# Patient Record
Sex: FEMALE | Race: WHITE | NOT HISPANIC OR LATINO | Employment: OTHER | URBAN - NONMETROPOLITAN AREA
[De-identification: names, ages, dates, MRNs, and addresses within clinical notes are randomized per-mention and may not be internally consistent; named-entity substitution may affect disease eponyms.]

---

## 2024-09-23 ENCOUNTER — OFFICE VISIT (OUTPATIENT)
Dept: FAMILY MEDICINE | Facility: CLINIC | Age: 49
End: 2024-09-23
Payer: MEDICARE

## 2024-09-23 VITALS
HEIGHT: 65 IN | BODY MASS INDEX: 30.66 KG/M2 | SYSTOLIC BLOOD PRESSURE: 110 MMHG | DIASTOLIC BLOOD PRESSURE: 64 MMHG | WEIGHT: 184 LBS | OXYGEN SATURATION: 97 % | HEART RATE: 83 BPM | TEMPERATURE: 97 F | RESPIRATION RATE: 18 BRPM

## 2024-09-23 DIAGNOSIS — F33.1 MODERATE EPISODE OF RECURRENT MAJOR DEPRESSIVE DISORDER: ICD-10-CM

## 2024-09-23 DIAGNOSIS — K21.9 GASTROESOPHAGEAL REFLUX DISEASE, UNSPECIFIED WHETHER ESOPHAGITIS PRESENT: ICD-10-CM

## 2024-09-23 DIAGNOSIS — R07.9 CHEST PAIN, UNSPECIFIED TYPE: ICD-10-CM

## 2024-09-23 DIAGNOSIS — E66.09 CLASS 1 OBESITY DUE TO EXCESS CALORIES WITH SERIOUS COMORBIDITY AND BODY MASS INDEX (BMI) OF 31.0 TO 31.9 IN ADULT: ICD-10-CM

## 2024-09-23 DIAGNOSIS — F41.9 ANXIETY: Primary | ICD-10-CM

## 2024-09-23 DIAGNOSIS — E78.2 MIXED HYPERLIPIDEMIA: ICD-10-CM

## 2024-09-23 DIAGNOSIS — Z87.19 HISTORY OF LIVER DISEASE: ICD-10-CM

## 2024-09-23 DIAGNOSIS — I10 ESSENTIAL HYPERTENSION: ICD-10-CM

## 2024-09-23 LAB
ALBUMIN SERPL BCP-MCNC: 3.9 G/DL (ref 3.5–5)
ALBUMIN/GLOB SERPL: 1.1 {RATIO}
ALP SERPL-CCNC: 104 U/L (ref 41–108)
ALT SERPL W P-5'-P-CCNC: 75 U/L (ref 13–56)
ANION GAP SERPL CALCULATED.3IONS-SCNC: 10 MMOL/L (ref 7–16)
AST SERPL W P-5'-P-CCNC: 44 U/L (ref 15–37)
BASOPHILS # BLD AUTO: 0.08 K/UL (ref 0–0.2)
BASOPHILS NFR BLD AUTO: 0.9 % (ref 0–1)
BILIRUB SERPL-MCNC: 0.4 MG/DL (ref ?–1.2)
BUN SERPL-MCNC: 21 MG/DL (ref 7–18)
BUN/CREAT SERPL: 25 (ref 6–20)
CALCIUM SERPL-MCNC: 9.4 MG/DL (ref 8.5–10.1)
CHLORIDE SERPL-SCNC: 103 MMOL/L (ref 98–107)
CHOLEST SERPL-MCNC: 184 MG/DL (ref 0–200)
CHOLEST/HDLC SERPL: 2.6 {RATIO}
CO2 SERPL-SCNC: 29 MMOL/L (ref 21–32)
CREAT SERPL-MCNC: 0.83 MG/DL (ref 0.55–1.02)
DIFFERENTIAL METHOD BLD: ABNORMAL
EGFR (NO RACE VARIABLE) (RUSH/TITUS): 86 ML/MIN/1.73M2
EOSINOPHIL # BLD AUTO: 0.12 K/UL (ref 0–0.5)
EOSINOPHIL NFR BLD AUTO: 1.4 % (ref 1–4)
ERYTHROCYTE [DISTWIDTH] IN BLOOD BY AUTOMATED COUNT: 12.6 % (ref 11.5–14.5)
GLOBULIN SER-MCNC: 3.7 G/DL (ref 2–4)
GLUCOSE SERPL-MCNC: 76 MG/DL (ref 74–106)
HCT VFR BLD AUTO: 44.1 % (ref 38–47)
HDLC SERPL-MCNC: 71 MG/DL (ref 40–60)
HGB BLD-MCNC: 14.4 G/DL (ref 12–16)
IMM GRANULOCYTES # BLD AUTO: 0.03 K/UL (ref 0–0.04)
IMM GRANULOCYTES NFR BLD: 0.3 % (ref 0–0.4)
LDLC SERPL CALC-MCNC: 73 MG/DL
LDLC/HDLC SERPL: 1 {RATIO}
LYMPHOCYTES # BLD AUTO: 2.34 K/UL (ref 1–4.8)
LYMPHOCYTES NFR BLD AUTO: 26.4 % (ref 27–41)
MCH RBC QN AUTO: 30.9 PG (ref 27–31)
MCHC RBC AUTO-ENTMCNC: 32.7 G/DL (ref 32–36)
MCV RBC AUTO: 94.6 FL (ref 80–96)
MONOCYTES # BLD AUTO: 0.78 K/UL (ref 0–0.8)
MONOCYTES NFR BLD AUTO: 8.8 % (ref 2–6)
MPC BLD CALC-MCNC: 12.1 FL (ref 9.4–12.4)
NEUTROPHILS # BLD AUTO: 5.52 K/UL (ref 1.8–7.7)
NEUTROPHILS NFR BLD AUTO: 62.2 % (ref 53–65)
NONHDLC SERPL-MCNC: 113 MG/DL
NRBC # BLD AUTO: 0 X10E3/UL
NRBC, AUTO (.00): 0 %
PLATELET # BLD AUTO: 288 K/UL (ref 150–400)
POTASSIUM SERPL-SCNC: 3.9 MMOL/L (ref 3.5–5.1)
PROT SERPL-MCNC: 7.6 G/DL (ref 6.4–8.2)
RBC # BLD AUTO: 4.66 M/UL (ref 4.2–5.4)
SODIUM SERPL-SCNC: 138 MMOL/L (ref 136–145)
TRIGL SERPL-MCNC: 199 MG/DL (ref 35–150)
VLDLC SERPL-MCNC: 40 MG/DL
WBC # BLD AUTO: 8.87 K/UL (ref 4.5–11)

## 2024-09-23 PROCEDURE — 1160F RVW MEDS BY RX/DR IN RCRD: CPT | Mod: ,,, | Performed by: FAMILY MEDICINE

## 2024-09-23 PROCEDURE — 4010F ACE/ARB THERAPY RXD/TAKEN: CPT | Mod: ,,, | Performed by: FAMILY MEDICINE

## 2024-09-23 PROCEDURE — 3078F DIAST BP <80 MM HG: CPT | Mod: ,,, | Performed by: FAMILY MEDICINE

## 2024-09-23 PROCEDURE — 85025 COMPLETE CBC W/AUTO DIFF WBC: CPT | Mod: ,,, | Performed by: CLINICAL MEDICAL LABORATORY

## 2024-09-23 PROCEDURE — 3074F SYST BP LT 130 MM HG: CPT | Mod: ,,, | Performed by: FAMILY MEDICINE

## 2024-09-23 PROCEDURE — 1159F MED LIST DOCD IN RCRD: CPT | Mod: ,,, | Performed by: FAMILY MEDICINE

## 2024-09-23 PROCEDURE — 3008F BODY MASS INDEX DOCD: CPT | Mod: ,,, | Performed by: FAMILY MEDICINE

## 2024-09-23 PROCEDURE — 99204 OFFICE O/P NEW MOD 45 MIN: CPT | Mod: ,,, | Performed by: FAMILY MEDICINE

## 2024-09-23 PROCEDURE — 80053 COMPREHEN METABOLIC PANEL: CPT | Mod: ,,, | Performed by: CLINICAL MEDICAL LABORATORY

## 2024-09-23 PROCEDURE — 93005 ELECTROCARDIOGRAM TRACING: CPT | Mod: ,,, | Performed by: FAMILY MEDICINE

## 2024-09-23 PROCEDURE — 93010 ELECTROCARDIOGRAM REPORT: CPT | Mod: ,,, | Performed by: FAMILY MEDICINE

## 2024-09-23 PROCEDURE — 80061 LIPID PANEL: CPT | Mod: ,,, | Performed by: CLINICAL MEDICAL LABORATORY

## 2024-09-23 RX ORDER — TRAZODONE HYDROCHLORIDE 100 MG/1
100 TABLET ORAL NIGHTLY
COMMUNITY

## 2024-09-23 RX ORDER — ATORVASTATIN CALCIUM 40 MG/1
20 TABLET, FILM COATED ORAL NIGHTLY
COMMUNITY
Start: 2024-02-21

## 2024-09-23 RX ORDER — OMEPRAZOLE 40 MG/1
40 CAPSULE, DELAYED RELEASE ORAL EVERY MORNING
COMMUNITY

## 2024-09-23 RX ORDER — VALSARTAN 40 MG/1
40 TABLET ORAL DAILY
COMMUNITY
Start: 2024-07-15 | End: 2025-07-15

## 2024-09-23 RX ORDER — HYDROXYZINE HYDROCHLORIDE 50 MG/1
50 TABLET, FILM COATED ORAL 3 TIMES DAILY PRN
Qty: 30 TABLET | Refills: 2 | Status: SHIPPED | OUTPATIENT
Start: 2024-09-23

## 2024-09-23 RX ORDER — HYDROXYZINE HYDROCHLORIDE 50 MG/1
50 TABLET, FILM COATED ORAL 3 TIMES DAILY PRN
COMMUNITY
Start: 2024-07-15 | End: 2024-09-23 | Stop reason: SDUPTHER

## 2024-09-23 RX ORDER — ESTRADIOL 1 MG/1
1 TABLET ORAL DAILY
COMMUNITY
Start: 2024-07-22

## 2024-09-23 RX ORDER — SERTRALINE HYDROCHLORIDE 25 MG/1
25 TABLET, FILM COATED ORAL DAILY
Qty: 30 TABLET | Refills: 5 | Status: SHIPPED | OUTPATIENT
Start: 2024-09-23 | End: 2025-09-23

## 2024-09-23 NOTE — PROGRESS NOTES
New Clinic Note    Chantel Mejia is a 48 y.o. female     CC:   Chief Complaint   Patient presents with    Establish Care     New patient to this clinic that wants to establish care with a new PCP. Moved here from [place] while in the middle of a divorce to take care of her elderly father who has multiple health issues. Wants to use [facility].     Hypertension    Hyperlipidemia    Anxiety    Medication Refill    Depression     Score 21 on Depression Scale. Lives with her son and takes care of elderly parents and going thru a divorce. Moved here from [place].         Subjective    History of Present Illness HPI   Patient is here to establish with a new physician. She has recently moved to [facility]. She is getting a divorce and is taking care of her elderly father. She reports having anxiety and depression. She takes hydroxyzine daily for anxiety. She complains of intermittent substernal chest pain for several months. She denies shortness of breath or nausea. She is not sure if it is related to her anxiety.     Current Outpatient Medications:     atorvastatin (LIPITOR) 40 MG tablet, Take 20 mg by mouth every evening., Disp: , Rfl:     estradioL (ESTRACE) 1 MG tablet, Take 1 mg by mouth once daily., Disp: , Rfl:     omeprazole (PRILOSEC) 40 MG capsule, Take 40 mg by mouth every morning., Disp: , Rfl:     traZODone (DESYREL) 100 MG tablet, Take 100 mg by mouth every evening., Disp: , Rfl:     valsartan (DIOVAN) 40 MG tablet, Take 40 mg by mouth once daily., Disp: , Rfl:     hydrOXYzine (ATARAX) 50 MG tablet, Take 1 tablet (50 mg total) by mouth 3 (three) times daily as needed for Anxiety., Disp: 30 tablet, Rfl: 2    sertraline (ZOLOFT) 25 MG tablet, Take 1 tablet (25 mg total) by mouth once daily., Disp: 30 tablet, Rfl: 5     Past Medical History:   Diagnosis Date    Anxiety     GERD (gastroesophageal reflux disease)     Hyperlipidemia     Hypertension         Family History   Problem Relation Name Age of Onset     "Arthritis Mother      Hyperlipidemia Mother      COPD Father      Hyperlipidemia Father      Heart disease Father      Diabetes Father      Dementia Father      Mental illness Brother          Past Surgical History:   Procedure Laterality Date    BREAST SURGERY       SECTION      CHOLECYSTECTOMY      EYE SURGERY      HYSTERECTOMY      JOINT REPLACEMENT          Review of Systems   Constitutional:  Negative for fatigue and fever.   HENT:  Negative for ear pain, postnasal drip, rhinorrhea and sinus pressure/congestion.    Respiratory:  Negative for cough and shortness of breath.    Cardiovascular:  Positive for chest pain.   Gastrointestinal:  Negative for abdominal pain, diarrhea, nausea and vomiting.   Genitourinary:  Negative for dysuria.   Neurological:  Negative for headaches.        /64 (BP Location: Left arm, Patient Position: Sitting, BP Method: Large (Automatic))   Pulse 83   Temp 97.2 °F (36.2 °C) (Oral)   Resp 18   Ht 5' 4.5" (1.638 m)   Wt 83.5 kg (184 lb)   SpO2 97%   BMI 31.10 kg/m²      Physical Exam  HENT:      Head: Normocephalic and atraumatic.   Cardiovascular:      Rate and Rhythm: Normal rate and regular rhythm.   Pulmonary:      Effort: Pulmonary effort is normal.      Breath sounds: Normal breath sounds.   Neurological:      Mental Status: She is alert and oriented to person, place, and time.      Gait: Gait abnormal.      Comments: Walks with a cane    Psychiatric:         Mood and Affect: Mood normal.         Behavior: Behavior normal.          Assessment and Plan      ICD-10-CM ICD-9-CM   1. Anxiety  F41.9 300.00   2. Moderate episode of recurrent major depressive disorder  F33.1 296.32   3. Mixed hyperlipidemia  E78.2 272.2   4. Gastroesophageal reflux disease, unspecified whether esophagitis present  K21.9 530.81   5. History of liver disease  Z87.19 V12.79   6. Essential hypertension  I10 401.9   7. Class 1 obesity due to excess calories with serious comorbidity and " body mass index (BMI) of 31.0 to 31.9 in adult  E66.09 278.00    Z68.31 V85.31   8. Chest pain, unspecified type  R07.9 786.50        1. Anxiety  Not controlled. Start Zoloft 25mg. Follow up in 3 weeks.   -     hydrOXYzine (ATARAX) 50 MG tablet; Take 1 tablet (50 mg total) by mouth 3 (three) times daily as needed for Anxiety.  Dispense: 30 tablet; Refill: 2  -     sertraline (ZOLOFT) 25 MG tablet; Take 1 tablet (25 mg total) by mouth once daily.  Dispense: 30 tablet; Refill: 5    2. Moderate episode of recurrent major depressive disorder  Not controlled. Start Zoloft 25mg.   -     sertraline (ZOLOFT) 25 MG tablet; Take 1 tablet (25 mg total) by mouth once daily.  Dispense: 30 tablet; Refill: 5    3. Mixed hyperlipidemia  The current medical regimen is effective;  continue present plan and medications.  -     Lipid Panel; Future; Expected date: 09/23/2024    4. Gastroesophageal reflux disease, unspecified whether esophagitis present  The current medical regimen is effective;  continue present plan and medications.    5. History of liver disease  Patient reports that it has been controlled since she lost 60 pounds.     6. Essential hypertension  The current medical regimen is effective;  continue present plan and medications.  -     Comprehensive Metabolic Panel; Future; Expected date: 09/23/2024  -     CBC Auto Differential; Future; Expected date: 09/23/2024  -     Lipid Panel; Future; Expected date: 09/23/2024    7. Class 1 obesity due to excess calories with serious comorbidity and body mass index (BMI) of 31.0 to 31.9 in adult  Diet and educational handouts given.    8. Chest pain  EKG - normal sinus rhythm. No ST elevation or depression.     Follow up in about 3 weeks (around 10/14/2024).

## 2024-09-24 NOTE — PROGRESS NOTES
Liver enzymes are a little elevated. She has a history of liver disease. Will need to set her up with GI so they can monitor.

## 2024-09-25 ENCOUNTER — TELEPHONE (OUTPATIENT)
Dept: FAMILY MEDICINE | Facility: CLINIC | Age: 49
End: 2024-09-25
Payer: MEDICARE

## 2024-09-25 DIAGNOSIS — R74.8 ELEVATED LIVER ENZYMES: Primary | ICD-10-CM

## 2024-09-25 NOTE — TELEPHONE ENCOUNTER
----- Message from Cece Hamilton MD sent at 9/24/2024  8:37 AM CDT -----  Liver enzymes are a little elevated. She has a history of liver disease. Will need to set her up with GI so they can monitor.

## 2024-10-14 ENCOUNTER — OFFICE VISIT (OUTPATIENT)
Dept: FAMILY MEDICINE | Facility: CLINIC | Age: 49
End: 2024-10-14
Payer: MEDICARE

## 2024-10-14 VITALS
TEMPERATURE: 97 F | HEART RATE: 56 BPM | DIASTOLIC BLOOD PRESSURE: 54 MMHG | HEIGHT: 65 IN | SYSTOLIC BLOOD PRESSURE: 97 MMHG | WEIGHT: 184 LBS | BODY MASS INDEX: 30.66 KG/M2 | OXYGEN SATURATION: 98 % | RESPIRATION RATE: 18 BRPM

## 2024-10-14 DIAGNOSIS — F33.1 MODERATE EPISODE OF RECURRENT MAJOR DEPRESSIVE DISORDER: ICD-10-CM

## 2024-10-14 DIAGNOSIS — F41.9 ANXIETY: Primary | ICD-10-CM

## 2024-10-14 DIAGNOSIS — Z12.31 ENCOUNTER FOR SCREENING MAMMOGRAM FOR MALIGNANT NEOPLASM OF BREAST: ICD-10-CM

## 2024-10-14 PROCEDURE — 1160F RVW MEDS BY RX/DR IN RCRD: CPT | Mod: ,,, | Performed by: FAMILY MEDICINE

## 2024-10-14 PROCEDURE — 1159F MED LIST DOCD IN RCRD: CPT | Mod: ,,, | Performed by: FAMILY MEDICINE

## 2024-10-14 PROCEDURE — 99214 OFFICE O/P EST MOD 30 MIN: CPT | Mod: ,,, | Performed by: FAMILY MEDICINE

## 2024-10-14 PROCEDURE — 3074F SYST BP LT 130 MM HG: CPT | Mod: ,,, | Performed by: FAMILY MEDICINE

## 2024-10-14 PROCEDURE — 3008F BODY MASS INDEX DOCD: CPT | Mod: ,,, | Performed by: FAMILY MEDICINE

## 2024-10-14 PROCEDURE — 3078F DIAST BP <80 MM HG: CPT | Mod: ,,, | Performed by: FAMILY MEDICINE

## 2024-10-14 PROCEDURE — 4010F ACE/ARB THERAPY RXD/TAKEN: CPT | Mod: ,,, | Performed by: FAMILY MEDICINE

## 2024-10-14 RX ORDER — SERTRALINE HYDROCHLORIDE 50 MG/1
50 TABLET, FILM COATED ORAL DAILY
Qty: 30 TABLET | Refills: 11 | Status: SHIPPED | OUTPATIENT
Start: 2024-10-14 | End: 2025-10-14

## 2024-10-14 NOTE — PROGRESS NOTES
New Clinic Note    Chantel Mejia is a 48 y.o. female     CC:   Chief Complaint   Patient presents with    Anxiety     Patient is here for three week follow up after starting on Sertraline. Stated she really cannot tell a difference and wants to increase her level. She has an appointment with GI on 10/28/24 for elevated liver enzymes. Never had Bone Density Test and stated she hurts all over in her bones. Stated she has strong FH of osteoarthritis. Wants a Mammogram at [facility]. She is up to date on PAP. Does not do the Flu vaccine.    Follow-up        Subjective    History of Present Illness HPI   Patient is here to follow up on anxiety and depression. She is tolerating the sertraline but does not think it is strong enough. It is not controlling her symptoms.     Current Outpatient Medications:     atorvastatin (LIPITOR) 40 MG tablet, Take 20 mg by mouth every evening., Disp: , Rfl:     estradioL (ESTRACE) 1 MG tablet, Take 1 mg by mouth once daily., Disp: , Rfl:     hydrOXYzine (ATARAX) 50 MG tablet, Take 1 tablet (50 mg total) by mouth 3 (three) times daily as needed for Anxiety., Disp: 30 tablet, Rfl: 2    omeprazole (PRILOSEC) 40 MG capsule, Take 40 mg by mouth every morning., Disp: , Rfl:     traZODone (DESYREL) 100 MG tablet, Take 100 mg by mouth every evening., Disp: , Rfl:     valsartan (DIOVAN) 40 MG tablet, Take 40 mg by mouth once daily., Disp: , Rfl:     sertraline (ZOLOFT) 50 MG tablet, Take 1 tablet (50 mg total) by mouth once daily., Disp: 30 tablet, Rfl: 11     Past Medical History:   Diagnosis Date    Anxiety     GERD (gastroesophageal reflux disease)     Hyperlipidemia     Hypertension         Family History   Problem Relation Name Age of Onset    Arthritis Mother      Hyperlipidemia Mother      COPD Father      Hyperlipidemia Father      Heart disease Father      Diabetes Father      Dementia Father      Mental illness Brother          Past Surgical History:   Procedure Laterality Date    BREAST  "SURGERY       SECTION      CHOLECYSTECTOMY      EYE SURGERY      HYSTERECTOMY      JOINT REPLACEMENT          Review of Systems   Constitutional:  Negative for fatigue and fever.   HENT:  Negative for ear pain, postnasal drip, rhinorrhea and sinus pressure/congestion.    Respiratory:  Negative for cough and shortness of breath.    Cardiovascular:  Negative for chest pain.   Gastrointestinal:  Negative for abdominal pain, diarrhea, nausea and vomiting.   Genitourinary:  Negative for dysuria.   Neurological:  Negative for headaches.        BP (!) 97/54   Pulse (!) 56   Temp 97.4 °F (36.3 °C) (Oral)   Resp 18   Ht 5' 4.5" (1.638 m)   Wt 83.5 kg (184 lb)   SpO2 98%   BMI 31.10 kg/m²      Physical Exam  HENT:      Head: Normocephalic and atraumatic.   Cardiovascular:      Rate and Rhythm: Normal rate and regular rhythm.   Pulmonary:      Effort: Pulmonary effort is normal.      Breath sounds: Normal breath sounds.   Neurological:      Mental Status: She is alert and oriented to person, place, and time.   Psychiatric:         Mood and Affect: Mood normal.         Behavior: Behavior normal.          Assessment and Plan      ICD-10-CM ICD-9-CM   1. Anxiety  F41.9 300.00   2. Moderate episode of recurrent major depressive disorder  F33.1 296.32   3. Encounter for screening mammogram for malignant neoplasm of breast  Z12.31 V76.12        1. Anxiety  Not controlled. Increase sertraline to 50 mg. Recheck in 3 weeks.   -     sertraline (ZOLOFT) 50 MG tablet; Take 1 tablet (50 mg total) by mouth once daily.  Dispense: 30 tablet; Refill: 11    2. Moderate episode of recurrent major depressive disorder  Not controlled. Increase sertraline to 50 mg. Recheck in 3 weeks.  -     sertraline (ZOLOFT) 50 MG tablet; Take 1 tablet (50 mg total) by mouth once daily.  Dispense: 30 tablet; Refill: 11    3. Encounter for screening mammogram for malignant neoplasm of breast  -     Mammo Digital Screening Bilat w/ [facility]; Future; " Expected date: 10/14/2024          Follow up in about 3 weeks (around 11/4/2024).

## 2024-11-04 ENCOUNTER — OFFICE VISIT (OUTPATIENT)
Dept: FAMILY MEDICINE | Facility: CLINIC | Age: 49
End: 2024-11-04
Payer: MEDICARE

## 2024-11-04 VITALS
SYSTOLIC BLOOD PRESSURE: 107 MMHG | HEART RATE: 67 BPM | OXYGEN SATURATION: 98 % | DIASTOLIC BLOOD PRESSURE: 64 MMHG | TEMPERATURE: 98 F | BODY MASS INDEX: 30.82 KG/M2 | HEIGHT: 65 IN | RESPIRATION RATE: 18 BRPM | WEIGHT: 185 LBS

## 2024-11-04 DIAGNOSIS — F41.9 ANXIETY: ICD-10-CM

## 2024-11-04 DIAGNOSIS — E78.2 MIXED HYPERLIPIDEMIA: ICD-10-CM

## 2024-11-04 DIAGNOSIS — I10 ESSENTIAL HYPERTENSION: ICD-10-CM

## 2024-11-04 DIAGNOSIS — K21.9 GASTROESOPHAGEAL REFLUX DISEASE, UNSPECIFIED WHETHER ESOPHAGITIS PRESENT: ICD-10-CM

## 2024-11-04 DIAGNOSIS — F33.1 MODERATE EPISODE OF RECURRENT MAJOR DEPRESSIVE DISORDER: Primary | ICD-10-CM

## 2024-11-04 PROCEDURE — 99214 OFFICE O/P EST MOD 30 MIN: CPT | Mod: ,,, | Performed by: FAMILY MEDICINE

## 2024-11-04 PROCEDURE — 1160F RVW MEDS BY RX/DR IN RCRD: CPT | Mod: ,,, | Performed by: FAMILY MEDICINE

## 2024-11-04 PROCEDURE — 1159F MED LIST DOCD IN RCRD: CPT | Mod: ,,, | Performed by: FAMILY MEDICINE

## 2024-11-04 PROCEDURE — 4010F ACE/ARB THERAPY RXD/TAKEN: CPT | Mod: ,,, | Performed by: FAMILY MEDICINE

## 2024-11-04 PROCEDURE — 3074F SYST BP LT 130 MM HG: CPT | Mod: ,,, | Performed by: FAMILY MEDICINE

## 2024-11-04 PROCEDURE — 3008F BODY MASS INDEX DOCD: CPT | Mod: ,,, | Performed by: FAMILY MEDICINE

## 2024-11-04 PROCEDURE — 3078F DIAST BP <80 MM HG: CPT | Mod: ,,, | Performed by: FAMILY MEDICINE

## 2024-11-04 RX ORDER — SERTRALINE HYDROCHLORIDE 100 MG/1
100 TABLET, FILM COATED ORAL DAILY
Qty: 30 TABLET | Refills: 11 | Status: SHIPPED | OUTPATIENT
Start: 2024-11-04 | End: 2025-11-04

## 2024-11-04 RX ORDER — ATORVASTATIN CALCIUM 40 MG/1
20 TABLET, FILM COATED ORAL NIGHTLY
Qty: 90 TABLET | Refills: 1 | Status: SHIPPED | OUTPATIENT
Start: 2024-11-04

## 2024-11-04 RX ORDER — ESTRADIOL 1 MG/1
1 TABLET ORAL DAILY
Qty: 90 TABLET | Refills: 1 | Status: SHIPPED | OUTPATIENT
Start: 2024-11-04

## 2024-11-04 RX ORDER — OMEPRAZOLE 40 MG/1
40 CAPSULE, DELAYED RELEASE ORAL EVERY MORNING
Qty: 90 CAPSULE | Refills: 1 | Status: SHIPPED | OUTPATIENT
Start: 2024-11-04

## 2024-11-04 RX ORDER — VALSARTAN 40 MG/1
40 TABLET ORAL DAILY
Qty: 90 TABLET | Refills: 1 | Status: SHIPPED | OUTPATIENT
Start: 2024-11-04 | End: 2025-11-04

## 2024-11-04 RX ORDER — HYDROXYZINE HYDROCHLORIDE 50 MG/1
50 TABLET, FILM COATED ORAL 3 TIMES DAILY PRN
Qty: 30 TABLET | Refills: 2 | Status: SHIPPED | OUTPATIENT
Start: 2024-11-04

## 2024-11-04 RX ORDER — TRAZODONE HYDROCHLORIDE 100 MG/1
100 TABLET ORAL NIGHTLY
Qty: 90 TABLET | Refills: 1 | Status: SHIPPED | OUTPATIENT
Start: 2024-11-04
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
## 2024-11-20 ENCOUNTER — PATIENT MESSAGE (OUTPATIENT)
Dept: GASTROENTEROLOGY | Facility: CLINIC | Age: 49
End: 2024-11-20
Payer: MEDICARE

## 2024-11-20 ENCOUNTER — OFFICE VISIT (OUTPATIENT)
Dept: GASTROENTEROLOGY | Facility: CLINIC | Age: 49
End: 2024-11-20
Payer: MEDICARE

## 2024-11-20 VITALS
RESPIRATION RATE: 18 BRPM | HEIGHT: 66 IN | WEIGHT: 189.63 LBS | HEART RATE: 69 BPM | SYSTOLIC BLOOD PRESSURE: 126 MMHG | BODY MASS INDEX: 30.47 KG/M2 | DIASTOLIC BLOOD PRESSURE: 43 MMHG

## 2024-11-20 DIAGNOSIS — R07.9 CHEST PAIN, UNSPECIFIED TYPE: ICD-10-CM

## 2024-11-20 DIAGNOSIS — R74.8 ELEVATED LIVER ENZYMES: ICD-10-CM

## 2024-11-20 DIAGNOSIS — K76.0 HEPATIC STEATOSIS: Primary | ICD-10-CM

## 2024-11-20 PROCEDURE — 99999 PR PBB SHADOW E&M-EST. PATIENT-LVL IV: CPT | Mod: PBBFAC,,,

## 2024-11-20 PROCEDURE — 99214 OFFICE O/P EST MOD 30 MIN: CPT | Mod: PBBFAC

## 2024-11-20 NOTE — PROGRESS NOTES
"Gastroenterology Clinic Note    Patient ID: [id number]   Referring MD: Cece Hamilton MD   Chief Complaint: No chief complaint on file.      History of Present Illness   Chantel Mejia is an 48 y.o. WF who is referred for elevated liver enzymes.  Patient reports being diagnosed with fatty liver disease in 2023.  She denies any prior liver biopsy.  She has no complaints of abdominal pain or nausea.  She was previously followed at [facility], but relocated to [place] 3 months ago and is transitioning her care here.  She denies use of alcohol or illicit drugs.  She does have medical history of HTN and hyperlipidemia.  She stopped smoking 3 yrs ago.  Reports mid-chest pain that she describes as intermittent and "squeezing" with associated SOB.  Ongoing since May 2024.  She has been seen in ED with normal EKG but no outpatient cardiology follow-up.  She does have GERD and is on Prilosec 40 mg daily.    Previous workup:Fibroscan     Last colonoscopy was 2023 with 3 yr recall for screening purposes with history of sessile serrated colon polyp.     Review of Systems   Constitutional:  Negative for weight loss.   Cardiovascular:  Positive for chest pain.   Gastrointestinal:  Negative for abdominal pain, blood in stool, constipation, diarrhea, heartburn, melena, nausea and vomiting.       Past Medical History      Past Medical History:   Diagnosis Date    Anxiety     Fatty liver     GERD (gastroesophageal reflux disease)     Hyperlipidemia     Hypertension        Past Surgical History     Past Surgical History:   Procedure Laterality Date    ANKLE FUSION Right 2023    BREAST SURGERY       SECTION      CHOLECYSTECTOMY      EYE SURGERY      HYSTERECTOMY      JOINT REPLACEMENT      TOTAL KNEE ARTHROPLASTY Left 2024       Allergies     Review of patient's allergies indicates:   Allergen Reactions    Penicillins Hives       Immunization History     There is no immunization history on " file for this patient.    Past Family History      Family History   Problem Relation Name Age of Onset    Arthritis Mother      Hyperlipidemia Mother      COPD Father      Hyperlipidemia Father      Heart disease Father      Diabetes Father      Dementia Father      Mental illness Brother         Past Social History      Social History     Socioeconomic History    Marital status:     Number of children: 2   Tobacco Use    Smoking status: Former     Current packs/day: 1.00     Average packs/day: 1 pack/day for 38.0 years (38.0 ttl pk-yrs)     Types: Cigarettes     Start date: 12/1/1986     Passive exposure: Past    Smokeless tobacco: Former    Tobacco comments:     Quit 2.5 years ago   Substance and Sexual Activity    Alcohol use: Never    Drug use: Never    Sexual activity: Not Currently   Social History Narrative    Lives with her son and his cousin     Social Drivers of Health     Financial Resource Strain: Medium Risk (9/22/2024)    Overall Financial Resource Strain (CARDIA)     Difficulty of Paying Living Expenses: Somewhat hard   Food Insecurity: Food Insecurity Present (9/22/2024)    Hunger Vital Sign     Worried About Running Out of Food in the Last Year: Sometimes true     Ran Out of Food in the Last Year: Sometimes true   Transportation Needs: No Transportation Needs (1/18/2024)    Received from [facility]    PRAPARE - Transportation     Lack of Transportation (Medical): No     Lack of Transportation (Non-Medical): No   Physical Activity: Inactive (9/22/2024)    Exercise Vital Sign     Days of Exercise per Week: 0 days     Minutes of Exercise per Session: 0 min   Stress: Stress Concern Present (9/22/2024)    [employer] - Occupational Stress Questionnaire     Feeling of Stress : Very much   Housing Stability: Unknown (9/22/2024)    Housing Stability Vital Sign     Unable to Pay for Housing in the Last Year: No       Current  "Medications     Outpatient Medications Marked as Taking for the 11/20/24 encounter (Office Visit) with Kasey Ashley FNP   Medication Sig Dispense Refill    atorvastatin (LIPITOR) 40 MG tablet Take 0.5 tablets (20 mg total) by mouth every evening. 90 tablet 1    estradioL (ESTRACE) 1 MG tablet Take 1 tablet (1 mg total) by mouth once daily. 90 tablet 1    hydrOXYzine (ATARAX) 50 MG tablet Take 1 tablet (50 mg total) by mouth 3 (three) times daily as needed for Anxiety. 30 tablet 2    omeprazole (PRILOSEC) 40 MG capsule Take 1 capsule (40 mg total) by mouth every morning. 90 capsule 1    sertraline (ZOLOFT) 100 MG tablet Take 1 tablet (100 mg total) by mouth once daily. 30 tablet 11    traZODone (DESYREL) 100 MG tablet Take 1 tablet (100 mg total) by mouth every evening. 90 tablet 1    valsartan (DIOVAN) 40 MG tablet Take 1 tablet (40 mg total) by mouth once daily. 90 tablet 1        I have reviewed the current medications, allergies, vital signs, past medical and surgical history, family medical history, and social history for this encounter and agree with all findings.    OBJECTIVE    Physical Exam    BP (!) 126/43 (BP Location: Left arm, Patient Position: Sitting)   Pulse 69   Resp 18   Ht 5' 6" (1.676 m)   Wt 86 kg (189 lb 9.6 oz)   BMI 30.60 kg/m²   GEN: Well appearing, cooperative, NAD  NECK: Supple, no LAD  CV: Normal rate  RESP: Unlabored  ABD: ND, no guarding  EXT: No clubbing, cyanosis, or edema  SKIN: Warm and dry  NEURO: AAO x4.     LABS    CBC (with or without Differential):   Lab Results   Component Value Date    WBC 8.87 09/23/2024    HGB 14.4 09/23/2024    HCT 44.1 09/23/2024    MCV 94.6 09/23/2024    MCH 30.9 09/23/2024    MCHC 32.7 09/23/2024    RDW 12.6 09/23/2024     09/23/2024    MPV 12.1 09/23/2024    NEUTOPHILPCT 62.2 09/23/2024    DIFFTYPE Auto 09/23/2024     BMP/CMP:   Lab Results   Component Value Date     09/23/2024    K 3.9 09/23/2024     09/23/2024    CO2 29 " 09/23/2024    BUN 21 (H) 09/23/2024    CREATININE 0.83 09/23/2024    GLU 76 09/23/2024    CALCIUM 9.4 09/23/2024    ALBUMIN 3.9 09/23/2024    AST 44 (H) 09/23/2024    ALT 75 (H) 09/23/2024    ALKPHOS 104 09/23/2024        IMAGING  US Fibroscan 06/2024  - FibroScan steatosis result (CAP score): 222 decibels per meter (dB/m)   FibroScan fibrosis result: 4.1 kilopascals (kPa)   IQR/med.: 10%     ASSESSMENT  Chantel Mejia is a 48 y.o. WF with history of HTN, hyperlipidemia, GERD, and fatty liver who is referred for elevated liver enzymes.     1. Hepatic steatosis    2. Elevated liver enzymes    3. Chest pain, unspecified type           PLAN    - chart reviewed; I do not see where chronic liver work up with labs have been performed so we will plan for labs and imaging for further evaluation of elevated liver enzymes   - referred to cardiology for chest pain given her risk factors and strong family history of cardiac issues; if cardiac work-up is unremarkable and symptoms persist, will plan for EGD; to ER with any change/worsening of symptoms     There are no Patient Instructions on file for this visit.      Orders Placed This Encounter   Procedures    US Abdomen Limited_Liver     Standing Status:   Future     Standing Expiration Date:   11/20/2025     Order Specific Question:   May the Radiologist modify the order per protocol to meet the clinical needs of the patient?     Answer:   Yes     Order Specific Question:   Release to patient     Answer:   Immediate    US Elastography Liver w/imaging     Standing Status:   Future     Standing Expiration Date:   11/20/2025     Order Specific Question:   May the Radiologist modify the order per protocol to meet the clinical needs of the patient?     Answer:   Yes     Order Specific Question:   Release to patient     Answer:   Immediate    Iron and TIBC     Standing Status:   Future     Standing Expiration Date:   1/20/2026    IgM     Standing Status:   Future     Standing  Expiration Date:   1/20/2026    IgG     Standing Status:   Future     Standing Expiration Date:   1/20/2026    Hepatitis B Surface Antigen     Standing Status:   Future     Standing Expiration Date:   1/20/2026    Hepatitis B Surface Ab, Qualitative     Standing Status:   Future     Standing Expiration Date:   1/20/2026    Hepatitis B Core Antibody, IgM     Standing Status:   Future     Standing Expiration Date:   1/20/2026    Hepatitis A Antibody, Total     Standing Status:   Future     Standing Expiration Date:   1/20/2026    Ferritin     Standing Status:   Future     Standing Expiration Date:   1/20/2026    Ceruloplasmin     Standing Status:   Future     Standing Expiration Date:   1/20/2026    Comprehensive Metabolic Panel     Standing Status:   Future     Standing Expiration Date:   1/20/2026    CBC Auto Differential     Standing Status:   Future     Standing Expiration Date:   1/20/2026    Antimitochondrial Antibody     Standing Status:   Future     Standing Expiration Date:   1/20/2026    Anti-Smooth Muscle Antibody     Standing Status:   Future     Standing Expiration Date:   1/20/2026    Alpha-1-Antitrypsin     Standing Status:   Future     Standing Expiration Date:   1/20/2026    CLARE EIA w/ Reflex to dsDNA/ASTON     Standing Status:   Future     Standing Expiration Date:   1/20/2026    AFP Tumor Marker     Standing Status:   Future     Standing Expiration Date:   1/20/2026     Order Specific Question:   Release to patient     Answer:   Immediate    Hepatitis C Antibody     Standing Status:   Future     Standing Expiration Date:   1/20/2026     Order Specific Question:   Release to patient     Answer:   Immediate    Ambulatory referral/consult to Cardiology     Standing Status:   Future     Standing Expiration Date:   12/20/2025     Referral Priority:   Urgent     Referral Type:   Consultation     Referral Reason:   Specialty Services Required     Requested Specialty:   Cardiology     Number of Visits  Requested:   1         The risks and benefits of my recommendations, as well as other treatment options were discussed with the patient today. All questions were answered.    45 minutes of total time spent on the encounter, which includes face to face time and non-face to face time preparing to see the patient (eg, review of tests), obtaining and/or reviewing separately obtained history, documenting clinical information in the electronic or other health record, Independently interpreting results (not separately reported) and communicating results to the patient/family/caregiver, or care coordination (not separately reported).        Kasey Ashley, YELITZAP/ACNP  [facility]

## 2024-12-03 DIAGNOSIS — E78.2 MIXED HYPERLIPIDEMIA: ICD-10-CM

## 2024-12-03 RX ORDER — ATORVASTATIN CALCIUM 40 MG/1
20 TABLET, FILM COATED ORAL NIGHTLY
Qty: 90 TABLET | Refills: 1 | Status: SHIPPED | OUTPATIENT
Start: 2024-12-03

## 2024-12-03 NOTE — TELEPHONE ENCOUNTER
----- Message from Jen sent at 12/3/2024  2:13 PM CST -----  Patient called and requested a refill of atorvastatin (LIPITOR) 40 MG tablet be sent to [facility] in [place]. Good call back is [phone]

## 2024-12-05 ENCOUNTER — HOSPITAL ENCOUNTER (OUTPATIENT)
Dept: RADIOLOGY | Facility: HOSPITAL | Age: 49
Discharge: HOME | End: 2024-12-05
Payer: MEDICARE

## 2024-12-05 DIAGNOSIS — R74.8 ELEVATED LIVER ENZYMES: ICD-10-CM

## 2024-12-05 DIAGNOSIS — K76.0 HEPATIC STEATOSIS: ICD-10-CM

## 2024-12-05 PROCEDURE — 76981 USE PARENCHYMA: CPT | Mod: TC

## 2024-12-05 PROCEDURE — 76981 USE PARENCHYMA: CPT | Mod: 26,,, | Performed by: RADIOLOGY

## 2024-12-05 PROCEDURE — 76705 ECHO EXAM OF ABDOMEN: CPT | Mod: 26,,, | Performed by: RADIOLOGY

## 2024-12-05 PROCEDURE — 76705 ECHO EXAM OF ABDOMEN: CPT | Mod: TC

## 2024-12-10 ENCOUNTER — TELEPHONE (OUTPATIENT)
Dept: GASTROENTEROLOGY | Facility: CLINIC | Age: 49
End: 2024-12-10
Payer: MEDICARE

## 2024-12-10 NOTE — TELEPHONE ENCOUNTER
Attempted to contact patient x2, no answer. No VM set up, unable to leave message for patient to return call to discuss results.

## 2024-12-10 NOTE — TELEPHONE ENCOUNTER
----- Message from TIMOTHY Arevalo sent at 12/9/2024 12:43 PM CST -----  Liver ultrasound without any focal liver lesions.  Metavir F0-F1, indicating normal to mild liver fibrosis.

## 2024-12-16 DIAGNOSIS — E78.2 MIXED HYPERLIPIDEMIA: ICD-10-CM

## 2024-12-16 RX ORDER — ATORVASTATIN CALCIUM 40 MG/1
20 TABLET, FILM COATED ORAL NIGHTLY
Qty: 90 TABLET | Refills: 0 | Status: SHIPPED | OUTPATIENT
Start: 2024-12-16

## 2024-12-17 ENCOUNTER — OFFICE VISIT (OUTPATIENT)
Dept: FAMILY MEDICINE | Facility: CLINIC | Age: 49
End: 2024-12-17
Payer: MEDICARE

## 2024-12-17 VITALS
HEART RATE: 60 BPM | TEMPERATURE: 98 F | DIASTOLIC BLOOD PRESSURE: 71 MMHG | SYSTOLIC BLOOD PRESSURE: 106 MMHG | HEIGHT: 66 IN | OXYGEN SATURATION: 97 % | BODY MASS INDEX: 30.7 KG/M2 | RESPIRATION RATE: 18 BRPM | WEIGHT: 191 LBS

## 2024-12-17 DIAGNOSIS — M54.50 ACUTE MIDLINE LOW BACK PAIN WITHOUT SCIATICA: Primary | ICD-10-CM

## 2024-12-17 DIAGNOSIS — L30.9 DERMATITIS: ICD-10-CM

## 2024-12-17 PROCEDURE — 99214 OFFICE O/P EST MOD 30 MIN: CPT | Mod: 25,,, | Performed by: NURSE PRACTITIONER

## 2024-12-17 PROCEDURE — 1159F MED LIST DOCD IN RCRD: CPT | Mod: ,,, | Performed by: NURSE PRACTITIONER

## 2024-12-17 PROCEDURE — 96372 THER/PROPH/DIAG INJ SC/IM: CPT | Mod: ,,, | Performed by: NURSE PRACTITIONER

## 2024-12-17 PROCEDURE — 4010F ACE/ARB THERAPY RXD/TAKEN: CPT | Mod: ,,, | Performed by: NURSE PRACTITIONER

## 2024-12-17 PROCEDURE — 3074F SYST BP LT 130 MM HG: CPT | Mod: ,,, | Performed by: NURSE PRACTITIONER

## 2024-12-17 PROCEDURE — 3008F BODY MASS INDEX DOCD: CPT | Mod: ,,, | Performed by: NURSE PRACTITIONER

## 2024-12-17 PROCEDURE — 3078F DIAST BP <80 MM HG: CPT | Mod: ,,, | Performed by: NURSE PRACTITIONER

## 2024-12-17 PROCEDURE — 1160F RVW MEDS BY RX/DR IN RCRD: CPT | Mod: ,,, | Performed by: NURSE PRACTITIONER

## 2024-12-17 RX ORDER — KETOROLAC TROMETHAMINE 30 MG/ML
30 INJECTION, SOLUTION INTRAMUSCULAR; INTRAVENOUS
Status: COMPLETED | OUTPATIENT
Start: 2024-12-17 | End: 2024-12-17

## 2024-12-17 RX ORDER — NYSTATIN 100000 U/G
OINTMENT TOPICAL 2 TIMES DAILY
Qty: 15 G | Refills: 0 | Status: SHIPPED | OUTPATIENT
Start: 2024-12-17

## 2024-12-17 RX ADMIN — KETOROLAC TROMETHAMINE 30 MG: 30 INJECTION, SOLUTION INTRAMUSCULAR; INTRAVENOUS at 02:12

## 2024-12-17 NOTE — PROGRESS NOTES
"   Aniya Abdalla DNP, TIMOTHY    [facility]  [address] [place] [address] [place] [address]     PATIENT NAME: Chantel Mejia  : 1975  DATE: 24  MRN: [id number]      Billing Provider: Aniya Abdalla DNP, FNP  Level of Service:   Patient PCP Information       Provider PCP Type    Primary Doctor No General            Reason for Visit / Chief Complaint: Back Pain (She says she's hurting in between in her shoulder blades and her mid back.  Her dad is on hospice and she turns and moves him.  The pain has been present since Saturday. She's been taking Ibuprofen but "it just isn't cutting the mustard".  She says certain movements increase the pain and rest helps.  ) and Rash (Reports a rash under her belly fold.  She says if she gets too hot, it breaks out and she would like a cream or something)       Update PCP  Update Chief Complaint         History of Present Illness / Problem Focused Workflow     Chantel Mejia presents to the clinic with Back Pain (She says she's hurting in between in her shoulder blades and her mid back.  Her dad is on hospice and she turns and moves him.  The pain has been present since Saturday. She's been taking Ibuprofen but "it just isn't cutting the mustard".  She says certain movements increase the pain and rest helps.  ) and Rash (Reports a rash under her belly fold.  She says if she gets too hot, it breaks out and she would like a cream or something)     Back Pain  Pertinent negatives include no abdominal pain, chest pain, dysuria, fever, headaches or weakness.   Rash  Pertinent negatives include no congestion, cough, diarrhea, fatigue, fever, shortness of breath, sore throat or vomiting.       Review of Systems     Review of Systems   Constitutional:  Negative for activity change, appetite change, chills, fatigue and fever.   HENT:  Negative for nasal congestion, ear pain, hearing loss, postnasal drip and sore throat.    Respiratory:  Negative for cough, chest " tightness, shortness of breath and wheezing.    Cardiovascular:  Negative for chest pain, palpitations, leg swelling and claudication.   Gastrointestinal:  Negative for abdominal pain, change in bowel habit, constipation, diarrhea, nausea and vomiting.   Genitourinary:  Negative for dysuria.   Musculoskeletal:  Positive for back pain. Negative for arthralgias, gait problem and myalgias.   Integumentary:  Positive for rash.   Neurological:  Negative for weakness and headaches.   Psychiatric/Behavioral:  Negative for suicidal ideas. The patient is not nervous/anxious.         Medical / Social / Family History     Past Medical History:   Diagnosis Date    Anxiety     Fatty liver     GERD (gastroesophageal reflux disease)     Hyperlipidemia     Hypertension        Past Surgical History:   Procedure Laterality Date    ANKLE FUSION Right 2023    BREAST SURGERY       SECTION      CHOLECYSTECTOMY      EYE SURGERY      HYSTERECTOMY      JOINT REPLACEMENT      TOTAL KNEE ARTHROPLASTY Left 2024    TUBAL LIGATION         Social History  Ms. Chantel Mejia  reports that she has quit smoking. Her smoking use included cigarettes. She started smoking about 38 years ago. She has a 60 pack-year smoking history. She has been exposed to tobacco smoke. She has quit using smokeless tobacco. She reports that she does not drink alcohol and does not use drugs.    Family History  Ms. Chantel Mejia's family history includes Arthritis in her mother; COPD in her father; Dementia in her father; Depression in her father and mother; Diabetes in her father; Heart disease in her father; Hyperlipidemia in her father and mother; Mental illness in her brother and brother; Miscarriages / Stillbirths in her mother.    Medications and Allergies     Medications  Outpatient Medications Marked as Taking for the 24 encounter (Office Visit) with Aniya Abdalla DNP, FNP   Medication Sig Dispense Refill    atorvastatin (LIPITOR) 40 MG tablet  "Take 0.5 tablets (20 mg total) by mouth every evening. 90 tablet 0    estradioL (ESTRACE) 1 MG tablet Take 1 tablet (1 mg total) by mouth once daily. 90 tablet 1    hydrOXYzine (ATARAX) 50 MG tablet Take 1 tablet (50 mg total) by mouth 3 (three) times daily as needed for Anxiety. 30 tablet 2    omeprazole (PRILOSEC) 40 MG capsule Take 1 capsule (40 mg total) by mouth every morning. 90 capsule 1    sertraline (ZOLOFT) 100 MG tablet Take 1 tablet (100 mg total) by mouth once daily. 30 tablet 11    traZODone (DESYREL) 100 MG tablet Take 1 tablet (100 mg total) by mouth every evening. 90 tablet 1    valsartan (DIOVAN) 40 MG tablet Take 1 tablet (40 mg total) by mouth once daily. 90 tablet 1     Current Facility-Administered Medications for the 12/17/24 encounter (Office Visit) with Aniya Abdalla DNP, FNP   Medication Dose Route Frequency Provider Last Rate Last Admin    [COMPLETED] ketorolac injection 30 mg  30 mg Intramuscular 1 time in Clinic/HOD Aniya Abdalla DNP, FNP   30 mg at 12/17/24 1450       Allergies  Review of patient's allergies indicates:   Allergen Reactions    Penicillins Hives       Physical Examination     Vitals:    12/17/24 1426   BP: 106/71   BP Location: Left arm   Patient Position: Sitting   Pulse: 60   Resp: 18   Temp: 97.7 °F (36.5 °C)   TempSrc: Oral   SpO2: 97%   Weight: 86.6 kg (191 lb)   Height: 5' 6" (1.676 m)     Physical Exam  Vitals and nursing note reviewed.   Constitutional:       General: She is not in acute distress.  HENT:      Nose: Nose normal.      Mouth/Throat:      Mouth: Mucous membranes are moist.   Eyes:      Pupils: Pupils are equal, round, and reactive to light.   Cardiovascular:      Rate and Rhythm: Normal rate and regular rhythm.      Pulses: Normal pulses.      Heart sounds: Normal heart sounds. No murmur heard.  Pulmonary:      Effort: Pulmonary effort is normal. No respiratory distress.      Breath sounds: Normal breath sounds. No wheezing, rhonchi or rales. "   Chest:      Chest wall: No tenderness.   Abdominal:      General: Bowel sounds are normal.      Palpations: Abdomen is soft.   Musculoskeletal:         General: Normal range of motion.      Cervical back: Normal range of motion and neck supple.      Thoracic back: Spasms and tenderness present.      Lumbar back: Spasms and tenderness present. Negative right straight leg raise test and negative left straight leg raise test.      Right lower leg: No edema.      Left lower leg: No edema.   Skin:     General: Skin is warm and dry.      Findings: Rash present. Rash is macular.             Comments: Candidiasis abdomen fold     Neurological:      General: No focal deficit present.      Mental Status: She is alert and oriented to person, place, and time.          Assessment and Plan (including Health Maintenance)      Problem List  Smart Sets  Document Outside HM   :    Plan:         Health Maintenance Due   Topic Date Due    Pneumococcal Vaccines (Age 0-64) (1 of 2 - PCV) Never done    HIV Screening  Never done    TETANUS VACCINE  Never done    Shingles Vaccine (1 of 2) Never done    COVID-19 Vaccine (1 - 2024-25 season) Never done    Mammogram  11/10/2024       Problem List Items Addressed This Visit    None  Visit Diagnoses       Acute midline low back pain without sciatica    -  Primary    Relevant Medications    ketorolac injection 30 mg (Completed) (Start on 12/17/2024  3:00 PM)    Dermatitis        Relevant Medications    nystatin (MYCOSTATIN) ointment          Acute midline low back pain without sciatica  -     ketorolac injection 30 mg    Dermatitis  -     nystatin (MYCOSTATIN) ointment; Apply topically 2 (two) times daily.  Dispense: 15 g; Refill: 0       Health Maintenance Topics with due status: Not Due       Topic Last Completion Date    Colorectal Cancer Screening 06/16/2023    Hemoglobin A1c (Diabetic Prevention Screening) 01/05/2024    Lipid Panel 09/23/2024    RSV Vaccine (Age 60+ and Pregnant patients)  Not Due         Future Appointments   Date Time Provider Department Center   1/13/2025  9:00 AM Brian Narvaez MD [facility]   4/8/2025  3:20 PM [facility] Cecelia   5/9/2025  8:00 AM AWV NURSE, [facility] FAMILY MEDICINE [facility] GEORGINA Luis   5/20/2025  2:40 PM Kasey Ashley, TIMOTHY [facility]        Follow up if symptoms worsen or fail to improve.     Signature:  Aniya Abdalla DNP, FNP  [address] [place] [address]  Phone #: [phone]  Fax #: [phone]    Date of encounter: 12/17/24    Patient Instructions   Recommend 2 Aleve twice a day for 2 weeks, ice and heat to affected area, and rest. Stretching exercises. If pain persists, follow up with primary care provider for further care.

## 2024-12-17 NOTE — PATIENT INSTRUCTIONS
Recommend 2 Aleve twice a day for 2 weeks, ice and heat to affected area, and rest. Stretching exercises. If pain persists, follow up with primary care provider for further care.

## 2024-12-31 ENCOUNTER — TELEPHONE (OUTPATIENT)
Dept: GASTROENTEROLOGY | Facility: CLINIC | Age: 49
End: 2024-12-31
Payer: MEDICARE

## 2024-12-31 NOTE — TELEPHONE ENCOUNTER
----- Message from TIMOTHY Arevalo sent at 12/31/2024  9:55 AM CST -----  Regarding: FW: Results    ----- Message -----  From: Rick Dolan MA  Sent: 12/27/2024  11:04 AM CST  To: TIMOTHY Arevalo  Subject: Results                                          Pt left a v/m wanting her results from  [phone]

## 2024-12-31 NOTE — TELEPHONE ENCOUNTER
Attempted to contact patient, no answer. No voicemail set up, unable to leave message for patient to return call.

## 2025-01-04 ENCOUNTER — HOSPITAL ENCOUNTER (EMERGENCY)
Facility: HOSPITAL | Age: 50
Discharge: HOME | End: 2025-01-04
Payer: MEDICARE

## 2025-01-04 VITALS
WEIGHT: 193 LBS | HEART RATE: 65 BPM | TEMPERATURE: 99 F | DIASTOLIC BLOOD PRESSURE: 73 MMHG | RESPIRATION RATE: 20 BRPM | BODY MASS INDEX: 30.29 KG/M2 | HEIGHT: 67 IN | SYSTOLIC BLOOD PRESSURE: 128 MMHG | OXYGEN SATURATION: 97 %

## 2025-01-04 DIAGNOSIS — S61.411A LACERATION OF RIGHT HAND WITHOUT FOREIGN BODY, INITIAL ENCOUNTER: Primary | ICD-10-CM

## 2025-01-04 PROCEDURE — 99282 EMERGENCY DEPT VISIT SF MDM: CPT | Mod: GF,25,, | Performed by: NURSE PRACTITIONER

## 2025-01-04 PROCEDURE — 12001 RPR S/N/AX/GEN/TRNK 2.5CM/<: CPT

## 2025-01-04 PROCEDURE — 12001 RPR S/N/AX/GEN/TRNK 2.5CM/<: CPT | Mod: ,,, | Performed by: NURSE PRACTITIONER

## 2025-01-04 PROCEDURE — 99282 EMERGENCY DEPT VISIT SF MDM: CPT | Mod: 25

## 2025-01-05 NOTE — DISCHARGE INSTRUCTIONS
Follow up with your primary care provider in 2-3 days.  Return to the emergency department for any signs of infection or any concerns

## 2025-01-05 NOTE — ED PROVIDER NOTES
Encounter Date: 2025       History     Chief Complaint   Patient presents with    Laceration     Right index finger     Patient presents today with complaint of laceration to right 2nd digit.  Laceration occurred from a new knife that she bought she was washing and soap and water.  Laceration occurred just prior to arrival.  She has mild complaints of pain and described as burning.  Denies any other injuries or contributing factors        Review of patient's allergies indicates:   Allergen Reactions    Penicillins Hives     Past Medical History:   Diagnosis Date    Anxiety     Fatty liver     GERD (gastroesophageal reflux disease)     Hyperlipidemia     Hypertension      Past Surgical History:   Procedure Laterality Date    ANKLE FUSION Right 2023    BREAST SURGERY       SECTION      CHOLECYSTECTOMY      EYE SURGERY      HYSTERECTOMY      JOINT REPLACEMENT      TOTAL KNEE ARTHROPLASTY Left 2024    TUBAL LIGATION       Family History   Problem Relation Name Age of Onset    Arthritis Mother Keri Pennington     Hyperlipidemia Mother Keri Pennington     Depression Mother Keri Pennington     Miscarriages / Stillbirths Mother Keri Pennington     COPD Father Lance Pennington     Hyperlipidemia Father Lance Pennington     Heart disease Father Lance Pennington     Diabetes Father Lance Pennington     Dementia Father Lance Pennington     Depression Father Lance Pennington     Mental illness Brother      Mental illness Brother Mando Pennington      Social History     Tobacco Use    Smoking status: Former     Current packs/day: 1.00     Average packs/day: 1.2 packs/day for 49.1 years (60.1 ttl pk-yrs)     Types: Cigarettes     Start date: 1986     Passive exposure: Past    Smokeless tobacco: Former    Tobacco comments:     Quit 2.5 years ago   Substance Use Topics    Alcohol use: Never    Drug use: Never     Review of Systems   Constitutional: Negative.    Respiratory: Negative.     Cardiovascular: Negative.    All other systems  reviewed and are negative.      Physical Exam     Initial Vitals [01/04/25 1803]   BP Pulse Resp Temp SpO2   128/73 65 20 98.5 °F (36.9 °C) 97 %      MAP       --         Physical Exam    Nursing note and vitals reviewed.  Constitutional: She appears well-developed and well-nourished.   Cardiovascular:  Normal rate, regular rhythm and normal heart sounds.           No murmur heard.  Pulmonary/Chest: Breath sounds normal. No respiratory distress.   Musculoskeletal:         General: Normal range of motion.     Neurological: She is alert and oriented to person, place, and time. She has normal strength. No cranial nerve deficit.   Skin:   1 cm laceration radial aspect of right 2nd digit just proximal to the dip joint   Psychiatric: She has a normal mood and affect.         Medical Screening Exam   See Full Note    ED Course   Lac Repair    Date/Time: 1/4/2025 6:31 PM    Performed by: Ricco Alonso NP  Authorized by: Ricco Alonso NP    Consent:     Consent obtained:  Verbal    Consent given by:  Patient    Risks discussed:  Infection, pain, poor cosmetic result and poor wound healing    Alternatives discussed:  No treatment  Anesthesia:     Anesthesia method:  None  Laceration details:     Location:  Finger    Finger location:  R index finger    Length (cm):  1  Exploration:     Contaminated: no    Treatment:     Area cleansed with:  Saline    Amount of cleaning:  Standard    Irrigation solution:  Sterile saline    Irrigation volume:  50    Irrigation method:  Syringe    Visualized foreign bodies/material removed: no      Debridement:  None    Undermining:  None    Scar revision: no    Skin repair:     Repair method:  Tissue adhesive  Approximation:     Approximation:  Close  Repair type:     Repair type:  Simple  Post-procedure details:     Dressing:  Open (no dressing)    Procedure completion:  Tolerated well, no immediate complications    Labs Reviewed - No data to display       Imaging Results    None           Medications - No data to display  Medical Decision Making             ED Course as of 01/04/25 1833   Sat Jan 04, 2025 1825 Laceration to right finger was cleaned and approximated with Dermabond.  See procedure note.  Patient will be discharged home [BC]      ED Course User Index  [BC] Ricco Alonso NP                           Clinical Impression:   Final diagnoses:  [S61.411A] Laceration of right hand without foreign body, initial encounter (Primary)        ED Disposition Condition    Discharge Stable          ED Prescriptions    None       Follow-up Information    None          Ricco Alonso, NINA  01/04/25 1833

## 2025-01-05 NOTE — ED TRIAGE NOTES
Presents to ed with boyfriend c/o laceration to right index finger .Reported she was drying a knife and cut finger.   
no concerns

## 2025-01-08 DIAGNOSIS — F41.9 ANXIETY: ICD-10-CM

## 2025-01-08 RX ORDER — HYDROXYZINE HYDROCHLORIDE 50 MG/1
50 TABLET, FILM COATED ORAL 3 TIMES DAILY PRN
Qty: 30 TABLET | Refills: 2 | Status: SHIPPED | OUTPATIENT
Start: 2025-01-08

## 2025-01-13 ENCOUNTER — OFFICE VISIT (OUTPATIENT)
Dept: CARDIOLOGY | Facility: CLINIC | Age: 50
End: 2025-01-13
Payer: MEDICARE

## 2025-01-13 VITALS
OXYGEN SATURATION: 95 % | HEART RATE: 72 BPM | SYSTOLIC BLOOD PRESSURE: 120 MMHG | BODY MASS INDEX: 30.23 KG/M2 | DIASTOLIC BLOOD PRESSURE: 88 MMHG | HEIGHT: 67 IN

## 2025-01-13 DIAGNOSIS — R07.9 CHEST PAIN, UNSPECIFIED TYPE: Primary | ICD-10-CM

## 2025-01-13 DIAGNOSIS — I10 ESSENTIAL HYPERTENSION: ICD-10-CM

## 2025-01-13 PROCEDURE — 3079F DIAST BP 80-89 MM HG: CPT | Mod: CPTII,,, | Performed by: STUDENT IN AN ORGANIZED HEALTH CARE EDUCATION/TRAINING PROGRAM

## 2025-01-13 PROCEDURE — 99204 OFFICE O/P NEW MOD 45 MIN: CPT | Mod: S$PBB,,, | Performed by: STUDENT IN AN ORGANIZED HEALTH CARE EDUCATION/TRAINING PROGRAM

## 2025-01-13 PROCEDURE — 4010F ACE/ARB THERAPY RXD/TAKEN: CPT | Mod: CPTII,,, | Performed by: STUDENT IN AN ORGANIZED HEALTH CARE EDUCATION/TRAINING PROGRAM

## 2025-01-13 PROCEDURE — 3008F BODY MASS INDEX DOCD: CPT | Mod: CPTII,,, | Performed by: STUDENT IN AN ORGANIZED HEALTH CARE EDUCATION/TRAINING PROGRAM

## 2025-01-13 PROCEDURE — 99999 PR PBB SHADOW E&M-EST. PATIENT-LVL IV: CPT | Mod: PBBFAC,,, | Performed by: STUDENT IN AN ORGANIZED HEALTH CARE EDUCATION/TRAINING PROGRAM

## 2025-01-13 PROCEDURE — 3074F SYST BP LT 130 MM HG: CPT | Mod: CPTII,,, | Performed by: STUDENT IN AN ORGANIZED HEALTH CARE EDUCATION/TRAINING PROGRAM

## 2025-01-13 PROCEDURE — 1159F MED LIST DOCD IN RCRD: CPT | Mod: CPTII,,, | Performed by: STUDENT IN AN ORGANIZED HEALTH CARE EDUCATION/TRAINING PROGRAM

## 2025-01-13 PROCEDURE — 99214 OFFICE O/P EST MOD 30 MIN: CPT | Mod: PBBFAC | Performed by: STUDENT IN AN ORGANIZED HEALTH CARE EDUCATION/TRAINING PROGRAM

## 2025-01-13 NOTE — PROGRESS NOTES
PCP: Lidia, Primary Doctor    Referring Provider: Kasey Ashley, TIMOTHY  [address] [place] [id number]    Reason for referral: Chest pain    Subjective:   Chantel Mejia is a 49 y.o. female with hx of hypertension, hyperlipidemia and GERD, who presents for a new patient visit.    She reports a 1 year history of daily episodes of chest pain.  These are described as either a sharp pain or a tightness.  Pain is largely positional.  She feels that when she bent forward and then sits back up she then experiences this pain.  She also feels at night when she is lying down in leans forward.  Pain is often reproducible as well.  Today, she noted tenderness in her central upper chest -> slightly left of her sternum.    Fhx:  No known family history of cardiac disease  Shx:  Former smoker.  Sixty pack-year smoking history.  Quit 2.5 years ago.    EKG 01/20/2025: Normal sinus rhythm  ECHO No results found for this or any previous visit.     CATH: No results found for this or any previous visit.     Lab Results   Component Value Date     09/23/2024    K 3.9 09/23/2024     09/23/2024    CO2 29 09/23/2024    BUN 21 (H) 09/23/2024    CREATININE 0.83 09/23/2024    CALCIUM 9.4 09/23/2024    ANIONGAP 10 09/23/2024       Lab Results   Component Value Date    CHOL 184 09/23/2024     Lab Results   Component Value Date    HDL 71 (H) 09/23/2024     Lab Results   Component Value Date    LDLCALC 73 09/23/2024     Lab Results   Component Value Date    TRIG 199 (H) 09/23/2024     Lab Results   Component Value Date    CHOLHDL 2.6 09/23/2024       Lab Results   Component Value Date    WBC 8.87 09/23/2024    HGB 14.4 09/23/2024    HCT 44.1 09/23/2024    MCV 94.6 09/23/2024     09/23/2024           Current Outpatient Medications:     atorvastatin (LIPITOR) 40 MG tablet, Take 0.5 tablets (20 mg total) by mouth every evening., Disp: 90 tablet, Rfl: 0    estradioL (ESTRACE) 1 MG tablet, Take 1 tablet (1 mg total) by mouth once  "daily., Disp: 90 tablet, Rfl: 1    hydrOXYzine (ATARAX) 50 MG tablet, Take 1 tablet (50 mg total) by mouth 3 (three) times daily as needed for Anxiety., Disp: 30 tablet, Rfl: 2    nystatin (MYCOSTATIN) ointment, Apply topically 2 (two) times daily., Disp: 15 g, Rfl: 0    omeprazole (PRILOSEC) 40 MG capsule, Take 1 capsule (40 mg total) by mouth every morning., Disp: 90 capsule, Rfl: 1    sertraline (ZOLOFT) 100 MG tablet, Take 1 tablet (100 mg total) by mouth once daily., Disp: 30 tablet, Rfl: 11    traZODone (DESYREL) 100 MG tablet, Take 1 tablet (100 mg total) by mouth every evening., Disp: 90 tablet, Rfl: 1    valsartan (DIOVAN) 40 MG tablet, Take 1 tablet (40 mg total) by mouth once daily., Disp: 90 tablet, Rfl: 1    Review of Systems   Constitutional:  Negative for chills, diaphoresis, fever and malaise/fatigue.   Respiratory:  Negative for cough and shortness of breath.    Cardiovascular:  Positive for chest pain. Negative for palpitations, orthopnea, claudication, leg swelling and PND.   Gastrointestinal:  Negative for abdominal pain, heartburn, nausea and vomiting.   Neurological:  Negative for dizziness.       Objective:   /88 (BP Location: Left arm, Patient Position: Sitting)   Pulse 72   Ht 5' 7" (1.702 m)   SpO2 95%   BMI 30.23 kg/m²     Physical Exam  Constitutional:       General: She is not in acute distress.     Appearance: Normal appearance.   Cardiovascular:      Rate and Rhythm: Normal rate and regular rhythm.      Pulses: Normal pulses.      Heart sounds: Normal heart sounds. No murmur heard.     No friction rub. No gallop.   Pulmonary:      Effort: Pulmonary effort is normal.      Breath sounds: Normal breath sounds. No wheezing or rales.   Musculoskeletal:      Right lower leg: No edema.      Left lower leg: No edema.   Skin:     General: Skin is warm and dry.   Neurological:      Mental Status: She is alert.           Assessment:     1. Essential hypertension  EKG 12-lead      2. " Chest pain, unspecified type  Ambulatory referral/consult to Cardiology            Plan:   No problem-specific Assessment & Plan notes found for this encounter.    Chest pain  Atypical; not associated with exertion.   Risk factors for CAD: HTN, HLD, former heavy smoker   Ddx: CAD vs pericarditis (latter due to positional nature of pain)  Schedule exercise treadmill test, no imaging  Schedule echo for evaluation of structural heart disease.      Follow up in 3 months

## 2025-01-16 ENCOUNTER — OFFICE VISIT (OUTPATIENT)
Dept: FAMILY MEDICINE | Facility: CLINIC | Age: 50
End: 2025-01-16
Payer: MEDICARE

## 2025-01-16 VITALS
HEIGHT: 67 IN | TEMPERATURE: 99 F | DIASTOLIC BLOOD PRESSURE: 79 MMHG | HEART RATE: 68 BPM | WEIGHT: 199 LBS | BODY MASS INDEX: 31.23 KG/M2 | OXYGEN SATURATION: 97 % | SYSTOLIC BLOOD PRESSURE: 119 MMHG | RESPIRATION RATE: 18 BRPM

## 2025-01-16 DIAGNOSIS — J02.9 SORE THROAT: ICD-10-CM

## 2025-01-16 DIAGNOSIS — R05.8 COUGH WITH EXPOSURE TO COVID-19 VIRUS: ICD-10-CM

## 2025-01-16 DIAGNOSIS — Z20.822 COUGH WITH EXPOSURE TO COVID-19 VIRUS: ICD-10-CM

## 2025-01-16 DIAGNOSIS — J01.90 ACUTE NON-RECURRENT SINUSITIS, UNSPECIFIED LOCATION: Primary | ICD-10-CM

## 2025-01-16 LAB
CTP QC/QA: YES
MOLECULAR STREP A: NEGATIVE
POC MOLECULAR INFLUENZA A AGN: NEGATIVE
POC MOLECULAR INFLUENZA B AGN: NEGATIVE
SARS-COV-2 RDRP RESP QL NAA+PROBE: NEGATIVE

## 2025-01-16 PROCEDURE — 96372 THER/PROPH/DIAG INJ SC/IM: CPT | Mod: ,,, | Performed by: FAMILY MEDICINE

## 2025-01-16 PROCEDURE — 87502 INFLUENZA DNA AMP PROBE: CPT | Mod: RHCUB | Performed by: FAMILY MEDICINE

## 2025-01-16 PROCEDURE — 4010F ACE/ARB THERAPY RXD/TAKEN: CPT | Mod: ,,, | Performed by: FAMILY MEDICINE

## 2025-01-16 PROCEDURE — 3078F DIAST BP <80 MM HG: CPT | Mod: ,,, | Performed by: FAMILY MEDICINE

## 2025-01-16 PROCEDURE — 87635 SARS-COV-2 COVID-19 AMP PRB: CPT | Mod: RHCUB | Performed by: FAMILY MEDICINE

## 2025-01-16 PROCEDURE — 3008F BODY MASS INDEX DOCD: CPT | Mod: ,,, | Performed by: FAMILY MEDICINE

## 2025-01-16 PROCEDURE — 99214 OFFICE O/P EST MOD 30 MIN: CPT | Mod: 25,,, | Performed by: FAMILY MEDICINE

## 2025-01-16 PROCEDURE — 87651 STREP A DNA AMP PROBE: CPT | Mod: RHCUB | Performed by: FAMILY MEDICINE

## 2025-01-16 PROCEDURE — 1159F MED LIST DOCD IN RCRD: CPT | Mod: ,,, | Performed by: FAMILY MEDICINE

## 2025-01-16 PROCEDURE — 3074F SYST BP LT 130 MM HG: CPT | Mod: ,,, | Performed by: FAMILY MEDICINE

## 2025-01-16 PROCEDURE — 1160F RVW MEDS BY RX/DR IN RCRD: CPT | Mod: ,,, | Performed by: FAMILY MEDICINE

## 2025-01-16 RX ORDER — AZITHROMYCIN 250 MG/1
TABLET, FILM COATED ORAL
Qty: 6 TABLET | Refills: 0 | Status: SHIPPED | OUTPATIENT
Start: 2025-01-16 | End: 2025-01-21

## 2025-01-16 RX ORDER — DEXAMETHASONE SODIUM PHOSPHATE 4 MG/ML
4 INJECTION, SOLUTION INTRA-ARTICULAR; INTRALESIONAL; INTRAMUSCULAR; INTRAVENOUS; SOFT TISSUE
Status: COMPLETED | OUTPATIENT
Start: 2025-01-16 | End: 2025-01-16

## 2025-01-16 RX ORDER — METHYLPREDNISOLONE ACETATE 40 MG/ML
40 INJECTION, SUSPENSION INTRA-ARTICULAR; INTRALESIONAL; INTRAMUSCULAR; SOFT TISSUE
Status: COMPLETED | OUTPATIENT
Start: 2025-01-16 | End: 2025-01-16

## 2025-01-16 RX ADMIN — DEXAMETHASONE SODIUM PHOSPHATE 4 MG: 4 INJECTION, SOLUTION INTRA-ARTICULAR; INTRALESIONAL; INTRAMUSCULAR; INTRAVENOUS; SOFT TISSUE at 09:01

## 2025-01-16 RX ADMIN — METHYLPREDNISOLONE ACETATE 40 MG: 40 INJECTION, SUSPENSION INTRA-ARTICULAR; INTRALESIONAL; INTRAMUSCULAR; SOFT TISSUE at 09:01

## 2025-01-16 NOTE — PROGRESS NOTES
"New Clinic Note    Chantel Mejia is a 49 y.o. female     CC:   Chief Complaint   Patient presents with    Sinusitis     Complains of sinus congestion, sinus pressure, sinus drainage, cough, red sore throat and bilateral ear discomfort. Has small sore in left nares she thinks is from finger nail.  She stays with her Father who is on hospice and stated she does not need anything contagious to take into his home. Stated she took OTC Dayquil, Nightquil, and Advil Cold and Sinus with lots of vitamins. She has had no fever with her sinus "crud".  Wants to use Entellium. Quit smoking 3 years ago.     Cough    Sore Throat    Headache    Otalgia        Subjective    History of Present Illness   History of Present Illness    CHIEF COMPLAINT:  Patient presents today for cold or sinus symptoms.    HISTORY OF PRESENT ILLNESS:  She reports one week of upper respiratory symptoms including sore throat with postnasal drip, worse in the morning with associated hoarseness. She has a productive cough with green sputum, particularly bothersome at night and disrupting sleep. She experiences bilateral ear discomfort, more pronounced on the right side. She reports nasal congestion without rhinorrhea, noting the nose feels dry. She experiences headaches and tender facial pain in the cheek area. She denies fever but reports associated nausea.    CURRENT MEDICATIONS:  She has been taking Advil Cough and Cold for symptom management. She has also been supplementing with vitamins A, D, C, and Zinc for the past week without significant improvement. She previously took Nyquil, Dayquil, and ibuprofen but discontinued due to liver disease.    MEDICAL HISTORY:  She has a history of liver disease. Liver ultrasound was recently performed at [facility].    ALLERGIES:  She reports an allergy to penicillin.      ROS:  General: -fever, -chills, -fatigue, -weight gain, -weight loss  Eyes: -vision changes, -redness, -discharge  ENT: +ear pain, +nasal " congestion, +sore throat  Cardiovascular: -chest pain, -palpitations, -lower extremity edema  Respiratory: +cough, -shortness of breath  Gastrointestinal: -abdominal pain, +nausea, -vomiting, -diarrhea, -constipation, -blood in stool  Genitourinary: -dysuria, -hematuria, -frequency  Musculoskeletal: -joint pain, -muscle pain  Skin: -rash, -lesion  Neurological: +headache, -dizziness, -numbness, -tingling  Psychiatric: -anxiety, -depression, -sleep difficulty            Current Outpatient Medications:     atorvastatin (LIPITOR) 40 MG tablet, Take 0.5 tablets (20 mg total) by mouth every evening., Disp: 90 tablet, Rfl: 0    estradioL (ESTRACE) 1 MG tablet, Take 1 tablet (1 mg total) by mouth once daily., Disp: 90 tablet, Rfl: 1    hydrOXYzine (ATARAX) 50 MG tablet, Take 1 tablet (50 mg total) by mouth 3 (three) times daily as needed for Anxiety., Disp: 30 tablet, Rfl: 2    nystatin (MYCOSTATIN) ointment, Apply topically 2 (two) times daily., Disp: 15 g, Rfl: 0    omeprazole (PRILOSEC) 40 MG capsule, Take 1 capsule (40 mg total) by mouth every morning., Disp: 90 capsule, Rfl: 1    sertraline (ZOLOFT) 100 MG tablet, Take 1 tablet (100 mg total) by mouth once daily., Disp: 30 tablet, Rfl: 11    traZODone (DESYREL) 100 MG tablet, Take 1 tablet (100 mg total) by mouth every evening., Disp: 90 tablet, Rfl: 1    valsartan (DIOVAN) 40 MG tablet, Take 1 tablet (40 mg total) by mouth once daily., Disp: 90 tablet, Rfl: 1    azithromycin (Z-HILARIO) 250 MG tablet, Take 2 tablets by mouth on day 1; Take 1 tablet by mouth on days 2-5, Disp: 6 tablet, Rfl: 0    brompheniramin-phenylephrin-DM (RYNEX DM) 1-2.5-5 mg/5 mL Soln, Take 10 mLs by mouth every 4 (four) hours as needed., Disp: 180 mL, Rfl: 1  No current facility-administered medications for this visit.     Past Medical History:   Diagnosis Date    Anxiety     Fatty liver     GERD (gastroesophageal reflux disease)     Hyperlipidemia     Hypertension         Family History   Problem  "Relation Name Age of Onset    Arthritis Mother Keri Pennington     Hyperlipidemia Mother Keri Pennington     Depression Mother Keri Pennington     Miscarriages / Stillbirths Mother Keri Pennington     COPD Father Lance Pennington     Hyperlipidemia Father Lance Pennington     Heart disease Father Lance Pennington     Diabetes Father Lance Pennington     Dementia Father Lance Pennington     Depression Father Lance Pennington     Mental illness Brother      Mental illness Brother Mando Pennington         Past Surgical History:   Procedure Laterality Date    ANKLE FUSION Right 2023    BREAST SURGERY       SECTION      CHOLECYSTECTOMY      EYE SURGERY      HYSTERECTOMY      JOINT REPLACEMENT      TOTAL KNEE ARTHROPLASTY Left 2024    TUBAL LIGATION          /79 (BP Location: Right arm, Patient Position: Sitting)   Pulse 68   Temp 98.9 °F (37.2 °C) (Oral)   Resp 18   Ht 5' 7" (1.702 m)   Wt 90.3 kg (199 lb)   SpO2 97%   BMI 31.17 kg/m²      Physical Exam     Assessment and Plan    Assessment & Plan    IMPRESSION:  - Diagnosed patient with sinusitis based on symptoms of headaches, face pain, green drainage, and duration of at least 1 week  - Negative swab tests ruled out other infectious causes  - Will treat with antibiotic and steroid to address infection and reduce inflammation  - Considered patient's liver condition when selecting appropriate medications    ACUTE RECURRENT SINUSITIS:  - Assessed the patient's symptoms, including sore throat, green drainage, and facial pain persisting for at least a week, and diagnosed acute recurrent sinusitis.  - Administered intramuscular steroid injection in office to reduce inflammation.  - Prescribed Z-Milton (azithromycin) for treatment of sinusitis.  - Prescribed cough syrup to manage associated symptoms.      COUGH:  - Prescribed prescription cough syrup for nighttime cough.  - Recommend OTC Robitussin DM as an alternative if prescription cough syrup is " cost-prohibitive.      HEADACHES:  - Noted patient's report of experiencing headaches.  - Acknowledged patient's use of ibuprofen for headache management.    NAUSEA:  - Noted patient's report of nausea, particularly in the morning.  - Educated the patient about the potential connection between postnasal drainage and nausea.    LIVER FIBROSIS:  - Reviewed recent liver ultrasound results indicating normal to mild liver fibrosis.  - Sent a message to Dr. Kasey Ashley regarding the patient's liver ultrasound results for specialist follow-up.    OTHER INSTRUCTIONS:  - Confirmed and documented patient's allergy to penicillin.          Problem List Items Addressed This Visit    None  Visit Diagnoses       Acute non-recurrent sinusitis, unspecified location    -  Primary    Relevant Medications    methylPREDNISolone acetate injection 40 mg (Completed)    dexAMETHasone injection 4 mg (Completed)    azithromycin (Z-HILARIO) 250 MG tablet    brompheniramin-phenylephrin-DM (RYNEX DM) 1-2.5-5 mg/5 mL Soln    Other Relevant Orders    POCT COVID-19 Rapid Screening (Completed)    POCT Influenza A/B Molecular (Completed)    POCT Strep A, Molecular (Completed)    Cough with exposure to COVID-19 virus        Relevant Orders    POCT COVID-19 Rapid Screening (Completed)    POCT Influenza A/B Molecular (Completed)    POCT Strep A, Molecular (Completed)    Sore throat        Relevant Orders    POCT COVID-19 Rapid Screening (Completed)    POCT Influenza A/B Molecular (Completed)    POCT Strep A, Molecular (Completed)             Orders Placed This Encounter   Procedures    POCT COVID-19 Rapid Screening    POCT Influenza A/B Molecular    POCT Strep A, Molecular        There are no Patient Instructions on file for this visit.     Follow up if symptoms worsen or fail to improve.     This note was generated with the assistance of ambient listening technology. Verbal consent was obtained by the patient and accompanying visitor(s) for the recording  of patient appointment to facilitate this note. I attest to having reviewed and edited the generated note for accuracy, though some syntax or spelling errors may persist. Please contact the author of this note for any clarification.

## 2025-01-16 NOTE — PROGRESS NOTES
"New Clinic Note    Chantel Mejia is a 49 y.o. female     CC:   Chief Complaint   Patient presents with    Sinusitis     Complains of sinus congestion, sinus pressure, sinus drainage, cough, red sore throat and bilateral ear discomfort. Has small sore in left nares she thinks is from finger nail.  She stays with her Father who is on hospice and stated she does not need anything contagious to take into his home. Stated she took OTC Dayquil, Nightquil, and Advil Cold and Sinus with lots of vitamins. She has had no fever with her sinus "crud".  Wants to use [employer]. Quit smoking 3 years ago.     Cough    Sore Throat    Headache    Otalgia        Subjective    History of Present Illness   History of Present Illness    CHIEF COMPLAINT:  Patient presents today for cold or sinus symptoms.    HISTORY OF PRESENT ILLNESS:  She reports one week of upper respiratory symptoms including sore throat with postnasal drip, worse in the morning with associated hoarseness. She has a productive cough with green sputum, particularly bothersome at night and disrupting sleep. She experiences bilateral ear discomfort, more pronounced on the right side. She reports nasal congestion without rhinorrhea, noting the nose feels dry. She experiences headaches and tender facial pain in the cheek area. She denies fever but reports associated nausea.    CURRENT MEDICATIONS:  She has been taking Advil Cough and Cold for symptom management. She has also been supplementing with vitamins A, D, C, and Zinc for the past week without significant improvement. She previously took Nyquil, Dayquil, and ibuprofen but discontinued due to liver disease.    MEDICAL HISTORY:  She has a history of liver disease. Liver ultrasound was recently performed at [place].    ALLERGIES:  She reports an allergy to penicillin.      ROS:  General: -fever, -chills, -fatigue, -weight gain, -weight loss  Eyes: -vision changes, -redness, -discharge  ENT: +ear pain, +nasal " congestion, +sore throat  Cardiovascular: -chest pain, -palpitations, -lower extremity edema  Respiratory: +cough, -shortness of breath  Gastrointestinal: -abdominal pain, +nausea, -vomiting, -diarrhea, -constipation, -blood in stool  Genitourinary: -dysuria, -hematuria, -frequency  Musculoskeletal: -joint pain, -muscle pain  Skin: -rash, -lesion  Neurological: +headache, -dizziness, -numbness, -tingling  Psychiatric: -anxiety, -depression, -sleep difficulty            Current Outpatient Medications:     atorvastatin (LIPITOR) 40 MG tablet, Take 0.5 tablets (20 mg total) by mouth every evening., Disp: 90 tablet, Rfl: 0    estradioL (ESTRACE) 1 MG tablet, Take 1 tablet (1 mg total) by mouth once daily., Disp: 90 tablet, Rfl: 1    hydrOXYzine (ATARAX) 50 MG tablet, Take 1 tablet (50 mg total) by mouth 3 (three) times daily as needed for Anxiety., Disp: 30 tablet, Rfl: 2    nystatin (MYCOSTATIN) ointment, Apply topically 2 (two) times daily., Disp: 15 g, Rfl: 0    omeprazole (PRILOSEC) 40 MG capsule, Take 1 capsule (40 mg total) by mouth every morning., Disp: 90 capsule, Rfl: 1    sertraline (ZOLOFT) 100 MG tablet, Take 1 tablet (100 mg total) by mouth once daily., Disp: 30 tablet, Rfl: 11    traZODone (DESYREL) 100 MG tablet, Take 1 tablet (100 mg total) by mouth every evening., Disp: 90 tablet, Rfl: 1    valsartan (DIOVAN) 40 MG tablet, Take 1 tablet (40 mg total) by mouth once daily., Disp: 90 tablet, Rfl: 1    azithromycin (Z-HILARIO) 250 MG tablet, Take 2 tablets by mouth on day 1; Take 1 tablet by mouth on days 2-5, Disp: 6 tablet, Rfl: 0    brompheniramin-phenylephrin-DM (RYNEX DM) 1-2.5-5 mg/5 mL Soln, Take 10 mLs by mouth every 4 (four) hours as needed., Disp: 180 mL, Rfl: 1  No current facility-administered medications for this visit.     Past Medical History:   Diagnosis Date    Anxiety     Fatty liver     GERD (gastroesophageal reflux disease)     Hyperlipidemia     Hypertension         Family History   Problem  "Relation Name Age of Onset    Arthritis Mother Keri Pennington     Hyperlipidemia Mother Keri Pennington     Depression Mother Keri Pennington     Miscarriages / Stillbirths Mother Keri Pennington     COPD Father Lance Pennington     Hyperlipidemia Father Lance Pennington     Heart disease Father Lance Pennington     Diabetes Father Lance Pennington     Dementia Father Lance Pennington     Depression Father Lance Pennington     Mental illness Brother      Mental illness Brother Mando Pennington         Past Surgical History:   Procedure Laterality Date    ANKLE FUSION Right 2023    BREAST SURGERY       SECTION      CHOLECYSTECTOMY      EYE SURGERY      HYSTERECTOMY      JOINT REPLACEMENT      TOTAL KNEE ARTHROPLASTY Left 2024    TUBAL LIGATION          /79 (BP Location: Right arm, Patient Position: Sitting)   Pulse 68   Temp 98.9 °F (37.2 °C) (Oral)   Resp 18   Ht 5' 7" (1.702 m)   Wt 90.3 kg (199 lb)   SpO2 97%   BMI 31.17 kg/m²      Physical Exam  HENT:      Head: Normocephalic and atraumatic.      Nose: Rhinorrhea present.   Cardiovascular:      Rate and Rhythm: Normal rate and regular rhythm.   Pulmonary:      Effort: Pulmonary effort is normal.      Breath sounds: Normal breath sounds.   Neurological:      Mental Status: She is alert and oriented to person, place, and time.   Psychiatric:         Mood and Affect: Mood normal.         Behavior: Behavior normal.          Assessment and Plan    Assessment & Plan              Problem List Items Addressed This Visit    None  Visit Diagnoses       Acute non-recurrent sinusitis, unspecified location    -  Primary    Relevant Medications    methylPREDNISolone acetate injection 40 mg (Completed)    dexAMETHasone injection 4 mg (Completed)    azithromycin (Z-HILARIO) 250 MG tablet    brompheniramin-phenylephrin-DM (RYNEX DM) 1-2.5-5 mg/5 mL Soln    Other Relevant Orders    POCT COVID-19 Rapid Screening (Completed)    POCT Influenza A/B Molecular (Completed)    POCT Strep A, " Molecular (Completed)    Cough with exposure to COVID-19 virus        Relevant Orders    POCT COVID-19 Rapid Screening (Completed)    POCT Influenza A/B Molecular (Completed)    POCT Strep A, Molecular (Completed)    Sore throat        Relevant Orders    POCT COVID-19 Rapid Screening (Completed)    POCT Influenza A/B Molecular (Completed)    POCT Strep A, Molecular (Completed)             Orders Placed This Encounter   Procedures    POCT COVID-19 Rapid Screening    POCT Influenza A/B Molecular    POCT Strep A, Molecular        There are no Patient Instructions on file for this visit.     No follow-ups on file.     This note was generated with the assistance of ambient listening technology. Verbal consent was obtained by the patient and accompanying visitor(s) for the recording of patient appointment to facilitate this note. I attest to having reviewed and edited the generated note for accuracy, though some syntax or spelling errors may persist. Please contact the author of this note for any clarification.

## 2025-01-17 ENCOUNTER — PATIENT MESSAGE (OUTPATIENT)
Dept: GASTROENTEROLOGY | Facility: CLINIC | Age: 50
End: 2025-01-17
Payer: MEDICARE

## 2025-01-17 ENCOUNTER — TELEPHONE (OUTPATIENT)
Dept: GASTROENTEROLOGY | Facility: CLINIC | Age: 50
End: 2025-01-17
Payer: MEDICARE

## 2025-01-28 DIAGNOSIS — K21.9 GASTROESOPHAGEAL REFLUX DISEASE, UNSPECIFIED WHETHER ESOPHAGITIS PRESENT: ICD-10-CM

## 2025-01-29 RX ORDER — OMEPRAZOLE 40 MG/1
40 CAPSULE, DELAYED RELEASE ORAL EVERY MORNING
Qty: 90 CAPSULE | Refills: 1 | Status: SHIPPED | OUTPATIENT
Start: 2025-01-29

## 2025-01-29 RX ORDER — ESTRADIOL 1 MG/1
1 TABLET ORAL DAILY
Qty: 90 TABLET | Refills: 1 | Status: SHIPPED | OUTPATIENT
Start: 2025-01-29

## 2025-01-31 ENCOUNTER — HOSPITAL ENCOUNTER (OUTPATIENT)
Dept: CARDIOLOGY | Facility: HOSPITAL | Age: 50
Discharge: HOME | End: 2025-01-31
Attending: STUDENT IN AN ORGANIZED HEALTH CARE EDUCATION/TRAINING PROGRAM
Payer: MEDICARE

## 2025-01-31 VITALS — SYSTOLIC BLOOD PRESSURE: 96 MMHG | DIASTOLIC BLOOD PRESSURE: 63 MMHG | HEART RATE: 60 BPM

## 2025-01-31 DIAGNOSIS — R07.9 CHEST PAIN, UNSPECIFIED TYPE: ICD-10-CM

## 2025-01-31 LAB
AORTIC ROOT ANNULUS: 2.64 CM
AV INDEX (PROSTH): 0.82
AV MEAN GRADIENT: 5 MMHG
AV PEAK GRADIENT: 10 MMHG
AV VALVE AREA BY VELOCITY RATIO: 2.3 CM²
AV VALVE AREA: 2.3 CM²
AV VELOCITY RATIO: 0.81
CV ECHO LV RWT: 0.22 CM
CV STRESS BASE HR: 60 BPM
DIASTOLIC BLOOD PRESSURE: 63 MMHG
DOP CALC AO PEAK VEL: 1.6 M/S
DOP CALC AO VTI: 39.2 CM
DOP CALC LVOT AREA: 2.8 CM2
DOP CALC LVOT DIAMETER: 1.9 CM
DOP CALC LVOT PEAK VEL: 1.3 M/S
DOP CALC LVOT STROKE VOLUME: 90.7 CM3
DOP CALCLVOT PEAK VEL VTI: 32 CM
E WAVE DECELERATION TIME: 278 MSEC
E/A RATIO: 1.26
E/E' RATIO: 8 M/S
ECHO LV POSTERIOR WALL: 0.5 CM (ref 0.6–1.1)
FRACTIONAL SHORTENING: 21.7 % (ref 28–44)
INTERVENTRICULAR SEPTUM: 0.7 CM (ref 0.6–1.1)
IVC DIAMETER: 1.25 CM
LEFT ATRIUM AREA SYSTOLIC (APICAL 2 CHAMBER): 18.72 CM2
LEFT ATRIUM AREA SYSTOLIC (APICAL 4 CHAMBER): 16.71 CM2
LEFT ATRIUM VOLUME MOD: 46 ML
LEFT INTERNAL DIMENSION IN SYSTOLE: 3.6 CM (ref 2.1–4)
LEFT VENTRICLE DIASTOLIC VOLUME: 95.14 ML
LEFT VENTRICLE END SYSTOLIC VOLUME APICAL 2 CHAMBER: 48.92 ML
LEFT VENTRICLE END SYSTOLIC VOLUME APICAL 4 CHAMBER: 40.85 ML
LEFT VENTRICLE SYSTOLIC VOLUME: 52.51 ML
LEFT VENTRICULAR INTERNAL DIMENSION IN DIASTOLE: 4.6 CM (ref 3.5–6)
LEFT VENTRICULAR MASS: 81.9 G
LV LATERAL E/E' RATIO: 6.9 M/S
LV SEPTAL E/E' RATIO: 9.2 M/S
LVED V (TEICH): 95.14 ML
LVES V (TEICH): 52.51 ML
LVOT MG: 3.78 MMHG
LVOT MV: 0.92 CM/S
MV PEAK A VEL: 0.66 M/S
MV PEAK E VEL: 0.83 M/S
OHS CV CPX 1 MINUTE RECOVERY HEART RATE: 94 BPM
OHS CV CPX 85 PERCENT MAX PREDICTED HEART RATE MALE: 144
OHS CV CPX MAX PREDICTED HEART RATE: 169
OHS CV CPX PATIENT IS FEMALE: 1
OHS CV CPX PATIENT IS MALE: 0
OHS CV CPX PEAK DIASTOLIC BLOOD PRESSURE: 73 MMHG
OHS CV CPX PEAK HEAR RATE: 122 BPM
OHS CV CPX PEAK RATE PRESSURE PRODUCT: NORMAL
OHS CV CPX PEAK SYSTOLIC BLOOD PRESSURE: 160 MMHG
OHS CV CPX PERCENT MAX PREDICTED HEART RATE ACHIEVED: 76
OHS CV CPX RATE PRESSURE PRODUCT PRESENTING: 5760
OHS CV RV/LV RATIO: 0.57 CM
OHS LV EJECTION FRACTION SIMPSONS BIPLANE MOD: 63 %
PV PEAK GRADIENT: 2 MMHG
PV PEAK VELOCITY: 0.77 M/S
RA PRESSURE ESTIMATED: 3 MMHG
RA VOL SYS: 29.83 ML
RIGHT ATRIAL AREA: 13.3 CM2
RIGHT ATRIUM VOLUME AREA LENGTH APICAL 4 CHAMBER: 28.18 ML
RIGHT VENTRICLE DIASTOLIC BASEL DIMENSION: 2.6 CM
RIGHT VENTRICLE DIASTOLIC LENGTH: 6.5 CM
RIGHT VENTRICLE DIASTOLIC MID DIMENSION: 2.5 CM
RIGHT VENTRICULAR LENGTH IN DIASTOLE (APICAL 4-CHAMBER VIEW): 6.48 CM
RV MID DIAMA: 2.52 CM
SYSTOLIC BLOOD PRESSURE: 96 MMHG
TDI LATERAL: 0.12 M/S
TDI SEPTAL: 0.09 M/S
TDI: 0.11 M/S
TRICUSPID ANNULAR PLANE SYSTOLIC EXCURSION: 2.16 CM

## 2025-01-31 PROCEDURE — 93306 TTE W/DOPPLER COMPLETE: CPT

## 2025-01-31 PROCEDURE — 93016 CV STRESS TEST SUPVJ ONLY: CPT | Mod: ,,, | Performed by: STUDENT IN AN ORGANIZED HEALTH CARE EDUCATION/TRAINING PROGRAM

## 2025-01-31 PROCEDURE — 93017 CV STRESS TEST TRACING ONLY: CPT

## 2025-01-31 PROCEDURE — 93018 CV STRESS TEST I&R ONLY: CPT | Mod: ,,, | Performed by: STUDENT IN AN ORGANIZED HEALTH CARE EDUCATION/TRAINING PROGRAM

## 2025-01-31 PROCEDURE — 93306 TTE W/DOPPLER COMPLETE: CPT | Mod: 26,,, | Performed by: INTERNAL MEDICINE

## 2025-02-11 ENCOUNTER — PATIENT OUTREACH (OUTPATIENT)
Facility: OTHER | Age: 50
End: 2025-02-11

## 2025-02-11 NOTE — LETTER
2025    Chantel Mejia  [id number] [address] [place] [address]             [facility]  Emergency Department  Nurse Navigator  Ph # [phone] 2025   Dear Chantel Mejia : 1975,     It was a pleasure to be able to speak with you today. As per our phone conversation, I have attached:  Some additional information for you, I have enclosed healthcare resources that may be helpful to you, such as: the [facility] on-call line contact information that is available to you , along with my number as the Emergency Department Nurse Navigator.  If you need me to set up an appointment for you or assist with any medical needs, you can reach out to me at [phone] or [facility]. I hope this information is beneficial to you! ?? Ill be reaching out within a few weeks.   Even though an Emergency Room may seem like your only option for your healthcare needs, please remember that Urgent care clinics are a safe option that typically have less wait time. Urgent Care/Healthcare centers are for serious conditions that arent an emergency. An Emergency Room is for life threatening emergencies. Additionally, it is essential to follow-up with your primary care physician regarding your concerns, being they know your health issues better than other physicians would and they are the ones who will have a long-term relationship with you, helping you stay healthy or get better when youre sick. Your PCP will be your partner, sharing the responsibility for your physical/mental health and wellbeing.   If you have any questions, please do not hesitate to contact me.   [facility] Nurse  on call [phone]      Yours truly,   Kiah Sandoval Lpn-Ed Nurse Navigator   Emergency Department Navigator    [email or web address]   PH#[phone]

## 2025-02-11 NOTE — PROGRESS NOTES
Kiah Sandoval LPN  ED Navigator  Emergency Department    [place] [facility]  Role: Community Health Worker    Date: 02/11/2025  Patient Name: Chantel Mejia  MRN: [id number]  PCP: No, Primary Doctor    Assessment:     Chantel Mejia is a 49 y.o. female who has presented to ED for 1/4/25. Patient has visited the ED 1 times in the past 3 months. Patient did not contact PCP.     ED Navigator Initial Assessment    ED Navigator Enrollment Documentation  Consent to Services  Does patient consent to completing the assessment?: Yes  Contact  Method of Initial Contact: Phone  Transportation  Insurance Coverage  Do you have coverage/adequate coverage?: Yes  Specialist Appointment  Did the patient come to the ED to see a specialist?: No  Does the patient have a pending specialist referral?: No  Does the patient have a specialist appointment made?: No  PCP Follow Up Appointment  Medications  Is patient able to afford medication?: Yes  Psychological  Food  Communication/Education  Other Financial Concerns  Other Social Barriers/Concerns  Primary Barrier  Next steps: Provided Education  Plan: Provided information for [facility] On Call 24/7 Nurse triage line, [phone] or [phone]-[phone] ([phone])         Social History     Socioeconomic History    Marital status: Single    Number of children: 2   Tobacco Use    Smoking status: Former     Current packs/day: 1.00     Average packs/day: 1.2 packs/day for 49.2 years (60.2 ttl pk-yrs)     Types: Cigarettes     Start date: 12/1/1986     Passive exposure: Past    Smokeless tobacco: Former    Tobacco comments:     Quit 2.5 years ago   Substance and Sexual Activity    Alcohol use: Never    Drug use: Never    Sexual activity: Not Currently     Partners: Male     Birth control/protection: None   Social History Narrative    Lives with her son and his cousin     Social Drivers of Health     Financial Resource Strain: Low Risk  (2/11/2025)    Overall Financial Resource Strain  (CARDIA)     Difficulty of Paying Living Expenses: Not hard at all   Food Insecurity: No Food Insecurity (2/11/2025)    Hunger Vital Sign     Worried About Running Out of Food in the Last Year: Never true     Ran Out of Food in the Last Year: Never true   Transportation Needs: No Transportation Needs (2/11/2025)    TRANSPORTATION NEEDS     Transportation : No   Physical Activity: Patient Declined (2/11/2025)    Exercise Vital Sign     Days of Exercise per Week: Patient declined     Minutes of Exercise per Session: Patient declined   Stress: No Stress Concern Present (2/11/2025)    [employer] - Occupational Stress Questionnaire     Feeling of Stress : Not at all   Housing Stability: Low Risk  (2/11/2025)    Housing Stability Vital Sign     Unable to Pay for Housing in the Last Year: No     Homeless in the Last Year: No       Plan:   Ed navigator will follow up with patient on or around  Ed navigator reminded patient to reach out if there is ever anything she can assist with  Ed navigator ensured patient had no other needs at this time  Patient stated still having issues with hand from previous emergency department instructed patient to contact pcp office to get in to be seen if issue isn't resolved patient stated understanding from the instructions from the ED Navigator  Ed navigator sent reminder letter to patient via [employer] with [facility] on call nurse and ed navigator information      Appointment made with: No appointment made with pcp at this time

## 2025-03-18 DIAGNOSIS — F41.9 ANXIETY: ICD-10-CM

## 2025-03-19 RX ORDER — HYDROXYZINE HYDROCHLORIDE 50 MG/1
50 TABLET, FILM COATED ORAL 3 TIMES DAILY PRN
Qty: 30 TABLET | Refills: 2 | Status: SHIPPED | OUTPATIENT
Start: 2025-03-19

## 2025-03-25 ENCOUNTER — PATIENT OUTREACH (OUTPATIENT)
Facility: OTHER | Age: 50
End: 2025-03-25

## 2025-03-25 NOTE — PROGRESS NOTES
3/25/25  Ed navigator first enrollment follow up outreach-unsuccessful  Ed navigator left message on voicemail to return ed navigator call at [phone]  Kiah Sandoval Lpn-ed navigator    3/26/25  Ed navigator second enrollment follow-up outreach-unsuccessful  Ed navigator unable to leave a message at this time  Ed navigator will follow up on or around 3/27/25  Kiah Sandoval Lpn-Ed Navigator    3/27/25  Ed navigator third attempt follow up outreach-unsuccessful  Telephone message Hey sorry I missed your call after the beep you know what to do, ed navigator left message for return call to [phone]  Ed navigator will close chart at this time  Kiah Sandoval Lpn-ed navigator

## 2025-04-14 ENCOUNTER — OFFICE VISIT (OUTPATIENT)
Dept: CARDIOLOGY | Facility: CLINIC | Age: 50
End: 2025-04-14
Payer: MEDICARE

## 2025-04-14 VITALS
BODY MASS INDEX: 33.43 KG/M2 | DIASTOLIC BLOOD PRESSURE: 84 MMHG | OXYGEN SATURATION: 94 % | SYSTOLIC BLOOD PRESSURE: 122 MMHG | HEART RATE: 75 BPM | WEIGHT: 213 LBS | HEIGHT: 67 IN

## 2025-04-14 DIAGNOSIS — R07.9 CHEST PAIN, UNSPECIFIED TYPE: Primary | ICD-10-CM

## 2025-04-14 PROCEDURE — 1159F MED LIST DOCD IN RCRD: CPT | Mod: CPTII,,, | Performed by: STUDENT IN AN ORGANIZED HEALTH CARE EDUCATION/TRAINING PROGRAM

## 2025-04-14 PROCEDURE — 99213 OFFICE O/P EST LOW 20 MIN: CPT | Mod: S$PBB,,, | Performed by: STUDENT IN AN ORGANIZED HEALTH CARE EDUCATION/TRAINING PROGRAM

## 2025-04-14 PROCEDURE — 99999 PR PBB SHADOW E&M-EST. PATIENT-LVL IV: CPT | Mod: PBBFAC,,, | Performed by: STUDENT IN AN ORGANIZED HEALTH CARE EDUCATION/TRAINING PROGRAM

## 2025-04-14 PROCEDURE — 99214 OFFICE O/P EST MOD 30 MIN: CPT | Mod: PBBFAC | Performed by: STUDENT IN AN ORGANIZED HEALTH CARE EDUCATION/TRAINING PROGRAM

## 2025-04-14 PROCEDURE — 3008F BODY MASS INDEX DOCD: CPT | Mod: CPTII,,, | Performed by: STUDENT IN AN ORGANIZED HEALTH CARE EDUCATION/TRAINING PROGRAM

## 2025-04-14 PROCEDURE — 4010F ACE/ARB THERAPY RXD/TAKEN: CPT | Mod: CPTII,,, | Performed by: STUDENT IN AN ORGANIZED HEALTH CARE EDUCATION/TRAINING PROGRAM

## 2025-04-14 PROCEDURE — 3079F DIAST BP 80-89 MM HG: CPT | Mod: CPTII,,, | Performed by: STUDENT IN AN ORGANIZED HEALTH CARE EDUCATION/TRAINING PROGRAM

## 2025-04-14 PROCEDURE — 3074F SYST BP LT 130 MM HG: CPT | Mod: CPTII,,, | Performed by: STUDENT IN AN ORGANIZED HEALTH CARE EDUCATION/TRAINING PROGRAM

## 2025-04-14 NOTE — PROGRESS NOTES
PCP: Cece Hamilton MD    Referring Provider:    Subjective:   Chantel Mejia is a 49 y.o. female with hx of hypertension, hyperlipidemia and GERD, who presents for follow-up.    4/14/25: Still having intermittent sharp chest pains. Pain is positional and reproducible.     1/13/25: Referred for evaluation of chest pain. She reports a 1 year history of daily episodes of chest pain.  These are described as either a sharp pain or a tightness.  Pain is largely positional.  She feels that when she bent forward and then sits back up she then experiences this pain.  She also feels at night when she is lying down in leans forward.  Pain is often reproducible as well.  Today, she noted tenderness in her central upper chest -> slightly left of her sternum.    Fhx:  No known family history of cardiac disease  Shx:  Former smoker.  Sixty pack-year smoking history.  Quit 2.5 years ago.    EKG 4/14/25: NSR, cannot r/o anterior infarct, age undetermined.   01/20/2025: Normal sinus rhythm    ECHO Results for orders placed during the hospital encounter of 01/31/25    Echo    Interpretation Summary    Left Ventricle: The left ventricle is normal in size. Normal wall thickness. There is normal systolic function with a visually estimated ejection fraction of 60 - 65%. Quantitated ejection fraction is 63%. There is normal diastolic function.    Right Ventricle: Normal right ventricular cavity size. Systolic function is normal.    Pulmonary Artery: Pulmonary artery pressure could not be accurately determined.    IVC/SVC: Normal venous pressure at 3 mmHg.       CATH: No results found for this or any previous visit.    Exercise stress-EKG  ECG portion of the study is negative for ischemia      Lab Results   Component Value Date     09/23/2024    K 3.9 09/23/2024     09/23/2024    CO2 29 09/23/2024    BUN 21 (H) 09/23/2024    CREATININE 0.83 09/23/2024    CALCIUM 9.4 09/23/2024    ANIONGAP 10 09/23/2024       Lab Results  "  Component Value Date    CHOL 184 09/23/2024     Lab Results   Component Value Date    HDL 71 (H) 09/23/2024     Lab Results   Component Value Date    LDLCALC 73 09/23/2024     Lab Results   Component Value Date    TRIG 199 (H) 09/23/2024     Lab Results   Component Value Date    CHOLHDL 2.6 09/23/2024       Lab Results   Component Value Date    WBC 8.87 09/23/2024    HGB 14.4 09/23/2024    HCT 44.1 09/23/2024    MCV 94.6 09/23/2024     09/23/2024           Current Outpatient Medications:     atorvastatin (LIPITOR) 40 MG tablet, Take 0.5 tablets (20 mg total) by mouth every evening., Disp: 90 tablet, Rfl: 0    estradioL (ESTRACE) 1 MG tablet, Take 1 tablet (1 mg total) by mouth once daily., Disp: 90 tablet, Rfl: 1    hydrOXYzine (ATARAX) 50 MG tablet, Take 1 tablet (50 mg total) by mouth 3 (three) times daily as needed for Anxiety., Disp: 30 tablet, Rfl: 2    nystatin (MYCOSTATIN) ointment, Apply topically 2 (two) times daily., Disp: 15 g, Rfl: 0    omeprazole (PRILOSEC) 40 MG capsule, Take 1 capsule (40 mg total) by mouth every morning., Disp: 90 capsule, Rfl: 1    sertraline (ZOLOFT) 100 MG tablet, Take 1 tablet (100 mg total) by mouth once daily., Disp: 30 tablet, Rfl: 11    traZODone (DESYREL) 100 MG tablet, Take 1 tablet (100 mg total) by mouth every evening., Disp: 90 tablet, Rfl: 1    valsartan (DIOVAN) 40 MG tablet, Take 1 tablet (40 mg total) by mouth once daily., Disp: 90 tablet, Rfl: 1    Review of Systems   Constitutional:  Negative for chills, diaphoresis, fever and malaise/fatigue.   Respiratory:  Negative for cough and shortness of breath.    Cardiovascular:  Positive for chest pain. Negative for palpitations, orthopnea, claudication, leg swelling and PND.   Gastrointestinal:  Negative for abdominal pain, heartburn, nausea and vomiting.   Neurological:  Negative for dizziness.       Objective:   /84 (BP Location: Left arm, Patient Position: Sitting)   Pulse 75   Ht 5' 7" (1.702 m)   " Wt 96.6 kg (213 lb)   SpO2 (!) 94%   BMI 33.36 kg/m²     Physical Exam  Constitutional:       General: She is not in acute distress.     Appearance: Normal appearance.   Cardiovascular:      Rate and Rhythm: Normal rate and regular rhythm.      Pulses: Normal pulses.      Heart sounds: Normal heart sounds. No murmur heard.     No friction rub. No gallop.   Pulmonary:      Effort: Pulmonary effort is normal.      Breath sounds: Normal breath sounds. No wheezing or rales.   Musculoskeletal:      Right lower leg: No edema.      Left lower leg: No edema.   Skin:     General: Skin is warm and dry.   Neurological:      Mental Status: She is alert.           Assessment:     1. Chest pain, unspecified type  EKG 12-lead    EKG 12-lead              Plan:   No problem-specific Assessment & Plan notes found for this encounter.    Chest pain  Atypical; not associated with exertion.   Echo w/ no clinically significant structural heart disease  Exercise treadmill test negative for ischemia   Suspect MSK pain given reproducible nature of pain    Follow up in 1 year

## 2025-05-08 NOTE — PROGRESS NOTES
"Chantel Mejia presented for an initial Medicare AWV today. The following components were reviewed and updated:    Medical history  Family History  Social history  Allergies and Current Medications  Health Risk Assessment  Health Maintenance  Care Team    **See Completed Assessments for Annual Wellness visit with in the encounter summary    The following assessments were completed:  Depression Screening  Cognitive function Screening  Timed Get Up Test  Whisper Test      Opioid documentation:      Patient does not have a current opioid prescription.          Vitals:    05/09/25 0832   BP: 110/70   Pulse: 62   Resp: 14   Temp: 97.4 °F (36.3 °C)   SpO2: 97%   Weight: 94.9 kg (209 lb 3.2 oz)   Height: 5' 7" (1.702 m)     Body mass index is 32.77 kg/m².       Physical Exam  Constitutional:       Appearance: Normal appearance.   Cardiovascular:      Rate and Rhythm: Normal rate and regular rhythm.   Pulmonary:      Effort: Pulmonary effort is normal.      Breath sounds: Normal breath sounds.   Neurological:      General: No focal deficit present.      Mental Status: She is alert and oriented to person, place, and time.   Psychiatric:         Mood and Affect: Mood normal.         Behavior: Behavior normal.           Diagnoses and health risks identified today and associated recommendations/orders:  1. Encounter for initial annual wellness visit (AWV) in Medicare patient    2. Gastroesophageal reflux disease, unspecified whether esophagitis present  The current medical regimen is effective;  continue present plan and medications.    3. Mixed hyperlipidemia  The current medical regimen is effective;  continue present plan and medications.    4. Essential hypertension  The current medical regimen is effective;  continue present plan and medications.    5. Anxiety  The current medical regimen is effective;  continue present plan and medications.  - hydrOXYzine (ATARAX) 50 MG tablet; Take 1 tablet (50 mg total) by mouth 3 " (three) times daily as needed for Anxiety.  Dispense: 30 tablet; Refill: 2    6. History of liver disease  Stable. Followed by GI.     7. Class 1 obesity due to excess calories with body mass index (BMI) of 32.0 to 32.9 in adult, unspecified whether serious comorbidity present  Diet and educational handouts given.    8. Abnormality of gait and mobility    9. Moderate episode of recurrent major depressive disorder  The current medical regimen is effective;  continue present plan and medications.      Provided Chantel with a 5-10 year written screening schedule and personal prevention plan. Recommendations were developed using the USPSTF age appropriate recommendations. Education, counseling, and referrals were provided as needed.  After Visit Summary printed and given to patient which includes a list of additional screenings\tests needed.  Health Maintenance Due   Topic Date Due    HIV Screening  Never done    TETANUS VACCINE  Never done    Pneumococcal Vaccines (Age 50+) (1 of 2 - PCV) Never done    Shingles Vaccine (1 of 2) Never done    COVID-19 Vaccine (1 - 2024-25 season) Never done    Mammogram  11/10/2024      Mammogram scheduled 7/23/2025 at 1:15 in [place].  Declined all vaccines.  Declined HIV screening  Follow up for 1 year for Annual Wellness Visit.      Cece Hamilton MD      I offered to discuss advanced care planning, including how to pick a person who would make decisions for you if you were unable to make them for yourself, called a health care power of , and what kind of decisions you might make such as use of life sustaining treatments such as ventilators and tube feeding when faced with a life limiting illness recorded on a living will that they will need to know. (How you want to be cared for as you near the end of your natural life)     X Patient is interested in learning more about how to make advanced directives.  I provided them paperwork and offered to discuss this with  them.

## 2025-05-09 ENCOUNTER — OFFICE VISIT (OUTPATIENT)
Dept: FAMILY MEDICINE | Facility: CLINIC | Age: 50
End: 2025-05-09
Payer: MEDICARE

## 2025-05-09 VITALS
TEMPERATURE: 97 F | OXYGEN SATURATION: 97 % | DIASTOLIC BLOOD PRESSURE: 70 MMHG | RESPIRATION RATE: 14 BRPM | BODY MASS INDEX: 32.83 KG/M2 | HEIGHT: 67 IN | HEART RATE: 62 BPM | WEIGHT: 209.19 LBS | SYSTOLIC BLOOD PRESSURE: 110 MMHG

## 2025-05-09 DIAGNOSIS — Z00.00 ENCOUNTER FOR INITIAL ANNUAL WELLNESS VISIT (AWV) IN MEDICARE PATIENT: Primary | ICD-10-CM

## 2025-05-09 DIAGNOSIS — F33.1 MODERATE EPISODE OF RECURRENT MAJOR DEPRESSIVE DISORDER: ICD-10-CM

## 2025-05-09 DIAGNOSIS — R26.9 ABNORMALITY OF GAIT AND MOBILITY: ICD-10-CM

## 2025-05-09 DIAGNOSIS — E66.811 CLASS 1 OBESITY DUE TO EXCESS CALORIES WITH BODY MASS INDEX (BMI) OF 32.0 TO 32.9 IN ADULT, UNSPECIFIED WHETHER SERIOUS COMORBIDITY PRESENT: ICD-10-CM

## 2025-05-09 DIAGNOSIS — I10 ESSENTIAL HYPERTENSION: ICD-10-CM

## 2025-05-09 DIAGNOSIS — F41.9 ANXIETY: ICD-10-CM

## 2025-05-09 DIAGNOSIS — E66.09 CLASS 1 OBESITY DUE TO EXCESS CALORIES WITH BODY MASS INDEX (BMI) OF 32.0 TO 32.9 IN ADULT, UNSPECIFIED WHETHER SERIOUS COMORBIDITY PRESENT: ICD-10-CM

## 2025-05-09 DIAGNOSIS — K21.9 GASTROESOPHAGEAL REFLUX DISEASE, UNSPECIFIED WHETHER ESOPHAGITIS PRESENT: ICD-10-CM

## 2025-05-09 DIAGNOSIS — Z87.19 HISTORY OF LIVER DISEASE: ICD-10-CM

## 2025-05-09 DIAGNOSIS — E78.2 MIXED HYPERLIPIDEMIA: ICD-10-CM

## 2025-05-09 RX ORDER — HYDROXYZINE HYDROCHLORIDE 50 MG/1
50 TABLET, FILM COATED ORAL 3 TIMES DAILY PRN
Qty: 30 TABLET | Refills: 2 | Status: SHIPPED | OUTPATIENT
Start: 2025-05-09

## 2025-05-09 NOTE — PATIENT INSTRUCTIONS
Counseling and Referral of Other Preventative  (Italic type indicates deductible and co-insurance are waived)    Patient Name: Chantel Mejia  Today's Date: 5/9/2025    Health Maintenance       Date Due Completion Date    HIV Screening Never done ---    TETANUS VACCINE Never done ---    Pneumococcal Vaccines (Age 50+) (1 of 2 - PCV) Never done ---    Shingles Vaccine (1 of 2) Never done ---    COVID-19 Vaccine (1 - 2024-25 season) Never done ---    Mammogram 11/10/2024 11/10/2023    Hemoglobin A1c (Diabetic Prevention Screening) 01/05/2027 1/5/2024    Lipid Panel 09/23/2029 9/23/2024    Colorectal Cancer Screening 06/16/2033 6/16/2023    RSV Vaccine (Age 60+ and Pregnant patients) (1 - 1-dose 75+ series) 12/01/2048 ---        No orders of the defined types were placed in this encounter.    The following information is provided to all patients.  This information is to help you find resources for any of the problems found today that may be affecting your health:                  Living healthy guide: [place]gov    Understanding Diabetes: www.diabetes.org      Eating healthy: www.cdc.gov/healthyweight      CDC home safety checklist: www.cdc.gov/steadi/patient.html      Agency on Aging: [place].gov    Alcoholics anonymous (AA): www.aa.org      Physical Activity: www.tonja.nih.gov/of6yxsj      Tobacco use: [place].gov

## 2025-07-10 DIAGNOSIS — I10 ESSENTIAL HYPERTENSION: ICD-10-CM

## 2025-07-11 RX ORDER — VALSARTAN 40 MG/1
40 TABLET ORAL
Qty: 90 TABLET | Refills: 0 | Status: SHIPPED | OUTPATIENT
Start: 2025-07-11

## 2025-07-29 DIAGNOSIS — F33.1 MODERATE EPISODE OF RECURRENT MAJOR DEPRESSIVE DISORDER: ICD-10-CM

## 2025-07-29 RX ORDER — TRAZODONE HYDROCHLORIDE 100 MG/1
100 TABLET ORAL NIGHTLY
Qty: 90 TABLET | Refills: 0 | Status: SHIPPED | OUTPATIENT
Start: 2025-07-29